# Patient Record
Sex: MALE | Race: WHITE | NOT HISPANIC OR LATINO | Employment: FULL TIME | URBAN - METROPOLITAN AREA
[De-identification: names, ages, dates, MRNs, and addresses within clinical notes are randomized per-mention and may not be internally consistent; named-entity substitution may affect disease eponyms.]

---

## 2023-05-25 ENCOUNTER — HOSPITAL ENCOUNTER (OUTPATIENT)
Facility: HOSPITAL | Age: 43
Setting detail: OBSERVATION
Discharge: HOME | End: 2023-05-27
Attending: EMERGENCY MEDICINE | Admitting: STUDENT IN AN ORGANIZED HEALTH CARE EDUCATION/TRAINING PROGRAM
Payer: MEDICAID

## 2023-05-25 ENCOUNTER — APPOINTMENT (OUTPATIENT)
Dept: CT IMAGING | Facility: HOSPITAL | Age: 43
End: 2023-05-25
Payer: MEDICAID

## 2023-05-25 DIAGNOSIS — K92.1 MELENA: ICD-10-CM

## 2023-05-25 DIAGNOSIS — R10.13 EPIGASTRIC PAIN: Primary | ICD-10-CM

## 2023-05-25 DIAGNOSIS — K92.2 GASTROINTESTINAL HEMORRHAGE, UNSPECIFIED GASTROINTESTINAL HEMORRHAGE TYPE: ICD-10-CM

## 2023-05-25 LAB
ABO GROUP BLD: NORMAL
ADV 40+41 DNA STL QL NAA+NON-PROBE: NOT DETECTED
ALBUMIN SERPL-MCNC: 4.5 G/DL (ref 3.5–5.2)
ALBUMIN/GLOB SERPL: 1.4 G/DL
ALP SERPL-CCNC: 130 U/L (ref 39–117)
ALT SERPL W P-5'-P-CCNC: 15 U/L (ref 1–41)
ANION GAP SERPL CALCULATED.3IONS-SCNC: 13 MMOL/L (ref 5–15)
APTT PPP: 30.6 SECONDS (ref 61–76.5)
AST SERPL-CCNC: 21 U/L (ref 1–40)
ASTRO TYP 1-8 RNA STL QL NAA+NON-PROBE: NOT DETECTED
BASOPHILS # BLD AUTO: 0.1 10*3/MM3 (ref 0–0.2)
BASOPHILS NFR BLD AUTO: 0.8 % (ref 0–1.5)
BILIRUB SERPL-MCNC: 0.4 MG/DL (ref 0–1.2)
BILIRUB UR QL STRIP: NEGATIVE
BLD GP AB SCN SERPL QL: NEGATIVE
BUN SERPL-MCNC: 15 MG/DL (ref 6–20)
BUN/CREAT SERPL: 19 (ref 7–25)
C CAYETANENSIS DNA STL QL NAA+NON-PROBE: NOT DETECTED
C COLI+JEJ+UPSA DNA STL QL NAA+NON-PROBE: NOT DETECTED
CALCIUM SPEC-SCNC: 9.3 MG/DL (ref 8.6–10.5)
CHLORIDE SERPL-SCNC: 107 MMOL/L (ref 98–107)
CLARITY UR: CLEAR
CO2 SERPL-SCNC: 24 MMOL/L (ref 22–29)
COLOR UR: YELLOW
CREAT SERPL-MCNC: 0.79 MG/DL (ref 0.76–1.27)
CRYPTOSP DNA STL QL NAA+NON-PROBE: NOT DETECTED
DEPRECATED RDW RBC AUTO: 42 FL (ref 37–54)
E HISTOLYT DNA STL QL NAA+NON-PROBE: NOT DETECTED
EAEC PAA PLAS AGGR+AATA ST NAA+NON-PRB: NOT DETECTED
EC STX1+STX2 GENES STL QL NAA+NON-PROBE: NOT DETECTED
EGFRCR SERPLBLD CKD-EPI 2021: 112.3 ML/MIN/1.73
EOSINOPHIL # BLD AUTO: 0.4 10*3/MM3 (ref 0–0.4)
EOSINOPHIL NFR BLD AUTO: 5.5 % (ref 0.3–6.2)
EPEC EAE GENE STL QL NAA+NON-PROBE: NOT DETECTED
ERYTHROCYTE [DISTWIDTH] IN BLOOD BY AUTOMATED COUNT: 13.8 % (ref 12.3–15.4)
ETEC LTA+ST1A+ST1B TOX ST NAA+NON-PROBE: NOT DETECTED
G LAMBLIA DNA STL QL NAA+NON-PROBE: NOT DETECTED
GLOBULIN UR ELPH-MCNC: 3.2 GM/DL
GLUCOSE SERPL-MCNC: 97 MG/DL (ref 65–99)
GLUCOSE UR STRIP-MCNC: NEGATIVE MG/DL
HCT VFR BLD AUTO: 41.1 % (ref 37.5–51)
HEMOCCULT STL QL IA: POSITIVE
HGB BLD-MCNC: 13.4 G/DL (ref 13–17.7)
HGB UR QL STRIP.AUTO: NEGATIVE
HOLD SPECIMEN: NORMAL
HOLD SPECIMEN: NORMAL
INR PPP: 0.99 (ref 0.93–1.1)
KETONES UR QL STRIP: ABNORMAL
LEUKOCYTE ESTERASE UR QL STRIP.AUTO: NEGATIVE
LIPASE SERPL-CCNC: 37 U/L (ref 13–60)
LYMPHOCYTES # BLD AUTO: 1.2 10*3/MM3 (ref 0.7–3.1)
LYMPHOCYTES NFR BLD AUTO: 14.7 % (ref 19.6–45.3)
MCH RBC QN AUTO: 28.4 PG (ref 26.6–33)
MCHC RBC AUTO-ENTMCNC: 32.6 G/DL (ref 31.5–35.7)
MCV RBC AUTO: 87.2 FL (ref 79–97)
MONOCYTES # BLD AUTO: 0.5 10*3/MM3 (ref 0.1–0.9)
MONOCYTES NFR BLD AUTO: 6.5 % (ref 5–12)
NEUTROPHILS NFR BLD AUTO: 5.9 10*3/MM3 (ref 1.7–7)
NEUTROPHILS NFR BLD AUTO: 72.5 % (ref 42.7–76)
NITRITE UR QL STRIP: NEGATIVE
NOROVIRUS GI+II RNA STL QL NAA+NON-PROBE: NOT DETECTED
NRBC BLD AUTO-RTO: 0 /100 WBC (ref 0–0.2)
P SHIGELLOIDES DNA STL QL NAA+NON-PROBE: NOT DETECTED
PH UR STRIP.AUTO: <=5 [PH] (ref 5–8)
PLATELET # BLD AUTO: 297 10*3/MM3 (ref 140–450)
PMV BLD AUTO: 7.5 FL (ref 6–12)
POTASSIUM SERPL-SCNC: 4.3 MMOL/L (ref 3.5–5.2)
PROT SERPL-MCNC: 7.7 G/DL (ref 6–8.5)
PROT UR QL STRIP: NEGATIVE
PROTHROMBIN TIME: 10.6 SECONDS (ref 9.6–11.7)
RBC # BLD AUTO: 4.71 10*6/MM3 (ref 4.14–5.8)
RH BLD: POSITIVE
RVA RNA STL QL NAA+NON-PROBE: NOT DETECTED
S ENT+BONG DNA STL QL NAA+NON-PROBE: NOT DETECTED
SAPO I+II+IV+V RNA STL QL NAA+NON-PROBE: NOT DETECTED
SHIGELLA SP+EIEC IPAH ST NAA+NON-PROBE: NOT DETECTED
SODIUM SERPL-SCNC: 144 MMOL/L (ref 136–145)
SP GR UR STRIP: 1.02 (ref 1–1.03)
T&S EXPIRATION DATE: NORMAL
UROBILINOGEN UR QL STRIP: ABNORMAL
V CHOL+PARA+VUL DNA STL QL NAA+NON-PROBE: NOT DETECTED
V CHOLERAE DNA STL QL NAA+NON-PROBE: NOT DETECTED
WBC NRBC COR # BLD: 8.1 10*3/MM3 (ref 3.4–10.8)
WHOLE BLOOD HOLD COAG: NORMAL
Y ENTEROCOL DNA STL QL NAA+NON-PROBE: NOT DETECTED

## 2023-05-25 PROCEDURE — 74177 CT ABD & PELVIS W/CONTRAST: CPT

## 2023-05-25 PROCEDURE — 25010000002 ONDANSETRON PER 1 MG: Performed by: EMERGENCY MEDICINE

## 2023-05-25 PROCEDURE — G0378 HOSPITAL OBSERVATION PER HR: HCPCS

## 2023-05-25 PROCEDURE — 85610 PROTHROMBIN TIME: CPT | Performed by: PHYSICIAN ASSISTANT

## 2023-05-25 PROCEDURE — 99284 EMERGENCY DEPT VISIT MOD MDM: CPT

## 2023-05-25 PROCEDURE — 85025 COMPLETE CBC W/AUTO DIFF WBC: CPT | Performed by: PHYSICIAN ASSISTANT

## 2023-05-25 PROCEDURE — 96374 THER/PROPH/DIAG INJ IV PUSH: CPT

## 2023-05-25 PROCEDURE — 25510000001 IOPAMIDOL PER 1 ML: Performed by: EMERGENCY MEDICINE

## 2023-05-25 PROCEDURE — 86900 BLOOD TYPING SEROLOGIC ABO: CPT | Performed by: PHYSICIAN ASSISTANT

## 2023-05-25 PROCEDURE — 83690 ASSAY OF LIPASE: CPT | Performed by: PHYSICIAN ASSISTANT

## 2023-05-25 PROCEDURE — 81003 URINALYSIS AUTO W/O SCOPE: CPT | Performed by: EMERGENCY MEDICINE

## 2023-05-25 PROCEDURE — 36415 COLL VENOUS BLD VENIPUNCTURE: CPT | Performed by: PHYSICIAN ASSISTANT

## 2023-05-25 PROCEDURE — 96375 TX/PRO/DX INJ NEW DRUG ADDON: CPT

## 2023-05-25 PROCEDURE — 80053 COMPREHEN METABOLIC PANEL: CPT | Performed by: PHYSICIAN ASSISTANT

## 2023-05-25 PROCEDURE — 86900 BLOOD TYPING SEROLOGIC ABO: CPT

## 2023-05-25 PROCEDURE — 86901 BLOOD TYPING SEROLOGIC RH(D): CPT

## 2023-05-25 PROCEDURE — 86850 RBC ANTIBODY SCREEN: CPT | Performed by: PHYSICIAN ASSISTANT

## 2023-05-25 PROCEDURE — 85730 THROMBOPLASTIN TIME PARTIAL: CPT | Performed by: PHYSICIAN ASSISTANT

## 2023-05-25 PROCEDURE — 87507 IADNA-DNA/RNA PROBE TQ 12-25: CPT | Performed by: EMERGENCY MEDICINE

## 2023-05-25 PROCEDURE — 86901 BLOOD TYPING SEROLOGIC RH(D): CPT | Performed by: PHYSICIAN ASSISTANT

## 2023-05-25 PROCEDURE — 82274 ASSAY TEST FOR BLOOD FECAL: CPT | Performed by: EMERGENCY MEDICINE

## 2023-05-25 RX ORDER — ACETAMINOPHEN 160 MG/5ML
650 SOLUTION ORAL EVERY 4 HOURS PRN
Status: DISCONTINUED | OUTPATIENT
Start: 2023-05-25 | End: 2023-05-27 | Stop reason: HOSPADM

## 2023-05-25 RX ORDER — ALBUTEROL SULFATE 2.5 MG/3ML
2.5 SOLUTION RESPIRATORY (INHALATION)
Status: DISCONTINUED | OUTPATIENT
Start: 2023-05-25 | End: 2023-05-25

## 2023-05-25 RX ORDER — SODIUM CHLORIDE 0.9 % (FLUSH) 0.9 %
10 SYRINGE (ML) INJECTION EVERY 12 HOURS SCHEDULED
Status: DISCONTINUED | OUTPATIENT
Start: 2023-05-25 | End: 2023-05-27 | Stop reason: HOSPADM

## 2023-05-25 RX ORDER — MORPHINE SULFATE 2 MG/ML
2 INJECTION, SOLUTION INTRAMUSCULAR; INTRAVENOUS EVERY 4 HOURS PRN
Status: DISCONTINUED | OUTPATIENT
Start: 2023-05-25 | End: 2023-05-27 | Stop reason: HOSPADM

## 2023-05-25 RX ORDER — ACETAMINOPHEN 650 MG/1
650 SUPPOSITORY RECTAL EVERY 4 HOURS PRN
Status: DISCONTINUED | OUTPATIENT
Start: 2023-05-25 | End: 2023-05-27 | Stop reason: HOSPADM

## 2023-05-25 RX ORDER — PANTOPRAZOLE SODIUM 40 MG/10ML
80 INJECTION, POWDER, LYOPHILIZED, FOR SOLUTION INTRAVENOUS ONCE
Status: COMPLETED | OUTPATIENT
Start: 2023-05-25 | End: 2023-05-25

## 2023-05-25 RX ORDER — ONDANSETRON 2 MG/ML
4 INJECTION INTRAMUSCULAR; INTRAVENOUS EVERY 6 HOURS PRN
Status: DISCONTINUED | OUTPATIENT
Start: 2023-05-25 | End: 2023-05-27 | Stop reason: HOSPADM

## 2023-05-25 RX ORDER — ONDANSETRON 2 MG/ML
4 INJECTION INTRAMUSCULAR; INTRAVENOUS ONCE
Status: COMPLETED | OUTPATIENT
Start: 2023-05-25 | End: 2023-05-25

## 2023-05-25 RX ORDER — SODIUM CHLORIDE 0.9 % (FLUSH) 0.9 %
10 SYRINGE (ML) INJECTION AS NEEDED
Status: DISCONTINUED | OUTPATIENT
Start: 2023-05-25 | End: 2023-05-27 | Stop reason: HOSPADM

## 2023-05-25 RX ORDER — SODIUM CHLORIDE 9 MG/ML
40 INJECTION, SOLUTION INTRAVENOUS AS NEEDED
Status: DISCONTINUED | OUTPATIENT
Start: 2023-05-25 | End: 2023-05-27 | Stop reason: HOSPADM

## 2023-05-25 RX ORDER — PANTOPRAZOLE SODIUM 40 MG/10ML
40 INJECTION, POWDER, LYOPHILIZED, FOR SOLUTION INTRAVENOUS
Status: DISCONTINUED | OUTPATIENT
Start: 2023-05-26 | End: 2023-05-26

## 2023-05-25 RX ORDER — SODIUM CHLORIDE 9 MG/ML
100 INJECTION, SOLUTION INTRAVENOUS CONTINUOUS
Status: DISCONTINUED | OUTPATIENT
Start: 2023-05-25 | End: 2023-05-27 | Stop reason: HOSPADM

## 2023-05-25 RX ORDER — ACETAMINOPHEN 325 MG/1
650 TABLET ORAL EVERY 4 HOURS PRN
Status: DISCONTINUED | OUTPATIENT
Start: 2023-05-25 | End: 2023-05-27 | Stop reason: HOSPADM

## 2023-05-25 RX ORDER — ALUMINA, MAGNESIA, AND SIMETHICONE 2400; 2400; 240 MG/30ML; MG/30ML; MG/30ML
15 SUSPENSION ORAL EVERY 6 HOURS PRN
Status: DISCONTINUED | OUTPATIENT
Start: 2023-05-25 | End: 2023-05-27 | Stop reason: HOSPADM

## 2023-05-25 RX ORDER — ONDANSETRON 4 MG/1
4 TABLET, FILM COATED ORAL EVERY 6 HOURS PRN
Status: DISCONTINUED | OUTPATIENT
Start: 2023-05-25 | End: 2023-05-27 | Stop reason: HOSPADM

## 2023-05-25 RX ORDER — CALCIUM CARBONATE 500 MG/1
2 TABLET, CHEWABLE ORAL 2 TIMES DAILY PRN
Status: DISCONTINUED | OUTPATIENT
Start: 2023-05-25 | End: 2023-05-27 | Stop reason: HOSPADM

## 2023-05-25 RX ORDER — CHOLECALCIFEROL (VITAMIN D3) 125 MCG
5 CAPSULE ORAL NIGHTLY PRN
Status: DISCONTINUED | OUTPATIENT
Start: 2023-05-25 | End: 2023-05-27 | Stop reason: HOSPADM

## 2023-05-25 RX ADMIN — PANTOPRAZOLE SODIUM 80 MG: 40 INJECTION, POWDER, FOR SOLUTION INTRAVENOUS at 20:43

## 2023-05-25 RX ADMIN — SODIUM CHLORIDE 100 ML/HR: 9 INJECTION, SOLUTION INTRAVENOUS at 23:55

## 2023-05-25 RX ADMIN — SODIUM CHLORIDE, POTASSIUM CHLORIDE, SODIUM LACTATE AND CALCIUM CHLORIDE 1000 ML: 600; 310; 30; 20 INJECTION, SOLUTION INTRAVENOUS at 20:42

## 2023-05-25 RX ADMIN — IOPAMIDOL 100 ML: 755 INJECTION, SOLUTION INTRAVENOUS at 22:06

## 2023-05-25 RX ADMIN — ONDANSETRON 4 MG: 2 INJECTION INTRAMUSCULAR; INTRAVENOUS at 20:43

## 2023-05-25 RX ADMIN — Medication 10 ML: at 23:11

## 2023-05-25 NOTE — ED PROVIDER NOTES
Subjective      Provider in Triage Note  Patient is a 43-year-old  male with no significant past medical history presents the ER complaints of abdominal pain, nausea, dark stools for a few days.  No appetite not able to eat or drink.  Patient states he has been under a lot of stress lately, he just moved from [place].  He states that they just sold their [place] house but also just bought a house in the area.  Patient states he has been having epigastric abdominal pain that is been on and off.  Some nausea, no vomiting.  He denies any diarrhea but states that today his stool was black.  He denies taking excessive amounts of NSAIDs.  No excessive alcohol or drinking.  No chest pain shortness of breath.  No fever chills.  Abdominal surgical history significant for gastric sleeve.  Wife had recently had Campylobacter gastroenteritis.  He has been taking over-the-counter Gas-X Mylanta without relief      History of Present Illness  History of present illness provider in triage note reviewed and agreed.        Review of Systems   Constitutional: Negative for chills and fever.   Respiratory: Negative for chest tightness and shortness of breath.    Cardiovascular: Negative for chest pain and palpitations.   Gastrointestinal: Positive for abdominal pain, blood in stool and nausea. Negative for vomiting.   Genitourinary: Negative for difficulty urinating and dysuria.   Musculoskeletal: Negative for back pain and neck pain.   Skin: Negative for rash and wound.   Neurological: Negative for dizziness and light-headedness.   Psychiatric/Behavioral: Negative for agitation and confusion.       Past Medical History:   Diagnosis Date   • Asthma    • Hypertension      No significant health problem he has had a gastric sleeve  Allergies   Allergen Reactions   • Molds & Smuts Hives   • Penicillins Hives       Past Surgical History:   Procedure Laterality Date   • LAPAROSCOPIC GASTRIC BYPASS       Gastric sleeve  History  reviewed. No pertinent family history.    Social History     Socioeconomic History   • Marital status:    Tobacco Use   • Smoking status: Never   • Smokeless tobacco: Never   Vaping Use   • Vaping Use: Never used   Substance and Sexual Activity   • Alcohol use: Never   • Drug use: Never   • Sexual activity: Defer   No routine prescription medications  Social history no tobacco alcohol or drug      Objective   Physical Exam  Constitutional this is a 43-year-old male awake alert no acute distress triage vital signs reviewed.  HEENT extraocular muscles are intact pupils equal round reactive sclera clear neck supple no adenopathy no JVD no bruits lungs clear no retractions heart regular without murmur.  Abdomen soft some mild mid abdominal epigastric tenderness but no rebound no guarding good bowel sounds no peritoneal findings or pulsatile masses.  Extremities pulses equal  no edema cords or Homans' sign or evidence of DVT.  Skin warm dry without rashes or cellulitic changes neurologic awake alert and follows commands monitorings normal without focal weakness.  Unable to perform rectal exam as the patient was in a hallway bed and we have no rooms available.  Procedures           ED Course      Results for orders placed or performed during the hospital encounter of 05/25/23   Gastrointestinal Panel, PCR - Stool, Per Rectum    Specimen: Per Rectum; Stool   Result Value Ref Range    Campylobacter Not Detected Not Detected    Plesiomonas shigelloides Not Detected Not Detected    Salmonella Not Detected Not Detected    Vibrio Not Detected Not Detected    Vibrio cholerae Not Detected Not Detected    Yersinia enterocolitica Not Detected Not Detected    Enteroaggregative E. coli (EAEC) Not Detected Not Detected    Enteropathogenic E. coli (EPEC) Not Detected Not Detected    Enterotoxigenic E. coli (ETEC) lt/st Not Detected Not Detected    Shiga-like toxin-producing E. coli (STEC) stx1/stx2 Not Detected Not Detected     Shigella/Enteroinvasive E. coli (EIEC) Not Detected Not Detected    Cryptosporidium Not Detected Not Detected    Cyclospora cayetanensis Not Detected Not Detected    Entamoeba histolytica Not Detected Not Detected    Giardia lamblia Not Detected Not Detected    Adenovirus F40/41 Not Detected Not Detected    Astrovirus Not Detected Not Detected    Norovirus GI/GII Not Detected Not Detected    Rotavirus A Not Detected Not Detected    Sapovirus (I, II, IV or V) Not Detected Not Detected   Comprehensive Metabolic Panel    Specimen: Blood   Result Value Ref Range    Glucose 97 65 - 99 mg/dL    BUN 15 6 - 20 mg/dL    Creatinine 0.79 0.76 - 1.27 mg/dL    Sodium 144 136 - 145 mmol/L    Potassium 4.3 3.5 - 5.2 mmol/L    Chloride 107 98 - 107 mmol/L    CO2 24.0 22.0 - 29.0 mmol/L    Calcium 9.3 8.6 - 10.5 mg/dL    Total Protein 7.7 6.0 - 8.5 g/dL    Albumin 4.5 3.5 - 5.2 g/dL    ALT (SGPT) 15 1 - 41 U/L    AST (SGOT) 21 1 - 40 U/L    Alkaline Phosphatase 130 (H) 39 - 117 U/L    Total Bilirubin 0.4 0.0 - 1.2 mg/dL    Globulin 3.2 gm/dL    A/G Ratio 1.4 g/dL    BUN/Creatinine Ratio 19.0 7.0 - 25.0    Anion Gap 13.0 5.0 - 15.0 mmol/L    eGFR 112.3 >60.0 mL/min/1.73   Protime-INR    Specimen: Blood   Result Value Ref Range    Protime 10.6 9.6 - 11.7 Seconds    INR 0.99 0.93 - 1.10   aPTT    Specimen: Blood   Result Value Ref Range    PTT 30.6 (L) 61.0 - 76.5 seconds   Lipase    Specimen: Blood   Result Value Ref Range    Lipase 37 13 - 60 U/L   CBC Auto Differential    Specimen: Blood   Result Value Ref Range    WBC 8.10 3.40 - 10.80 10*3/mm3    RBC 4.71 4.14 - 5.80 10*6/mm3    Hemoglobin 13.4 13.0 - 17.7 g/dL    Hematocrit 41.1 37.5 - 51.0 %    MCV 87.2 79.0 - 97.0 fL    MCH 28.4 26.6 - 33.0 pg    MCHC 32.6 31.5 - 35.7 g/dL    RDW 13.8 12.3 - 15.4 %    RDW-SD 42.0 37.0 - 54.0 fl    MPV 7.5 6.0 - 12.0 fL    Platelets 297 140 - 450 10*3/mm3    Neutrophil % 72.5 42.7 - 76.0 %    Lymphocyte % 14.7 (L) 19.6 - 45.3 %    Monocyte % 6.5  5.0 - 12.0 %    Eosinophil % 5.5 0.3 - 6.2 %    Basophil % 0.8 0.0 - 1.5 %    Neutrophils, Absolute 5.90 1.70 - 7.00 10*3/mm3    Lymphocytes, Absolute 1.20 0.70 - 3.10 10*3/mm3    Monocytes, Absolute 0.50 0.10 - 0.90 10*3/mm3    Eosinophils, Absolute 0.40 0.00 - 0.40 10*3/mm3    Basophils, Absolute 0.10 0.00 - 0.20 10*3/mm3    nRBC 0.0 0.0 - 0.2 /100 WBC   Urinalysis With Culture If Indicated - Urine, Clean Catch    Specimen: Urine, Clean Catch   Result Value Ref Range    Color, UA Yellow Yellow, Straw    Appearance, UA Clear Clear    pH, UA <=5.0 5.0 - 8.0    Specific Gravity, UA 1.025 1.005 - 1.030    Glucose, UA Negative Negative    Ketones, UA 40 mg/dL (2+) (A) Negative    Bilirubin, UA Negative Negative    Blood, UA Negative Negative    Protein, UA Negative Negative    Leuk Esterase, UA Negative Negative    Nitrite, UA Negative Negative    Urobilinogen, UA 0.2 E.U./dL 0.2 - 1.0 E.U./dL   Occult Blood, Fecal By Immunoassay - Stool, Per Rectum    Specimen: Per Rectum; Stool   Result Value Ref Range    Occult Blood, Fecal by Immunoassay Positive (A) Negative   CBC Auto Differential    Specimen: Blood   Result Value Ref Range    WBC 7.60 3.40 - 10.80 10*3/mm3    RBC 4.42 4.14 - 5.80 10*6/mm3    Hemoglobin 12.7 (L) 13.0 - 17.7 g/dL    Hematocrit 38.4 37.5 - 51.0 %    MCV 86.8 79.0 - 97.0 fL    MCH 28.7 26.6 - 33.0 pg    MCHC 33.1 31.5 - 35.7 g/dL    RDW 13.6 12.3 - 15.4 %    RDW-SD 41.6 37.0 - 54.0 fl    MPV 6.9 6.0 - 12.0 fL    Platelets 286 140 - 450 10*3/mm3    Neutrophil % 64.7 42.7 - 76.0 %    Lymphocyte % 20.0 19.6 - 45.3 %    Monocyte % 7.7 5.0 - 12.0 %    Eosinophil % 6.2 0.3 - 6.2 %    Basophil % 1.4 0.0 - 1.5 %    Neutrophils, Absolute 4.90 1.70 - 7.00 10*3/mm3    Lymphocytes, Absolute 1.50 0.70 - 3.10 10*3/mm3    Monocytes, Absolute 0.60 0.10 - 0.90 10*3/mm3    Eosinophils, Absolute 0.50 (H) 0.00 - 0.40 10*3/mm3    Basophils, Absolute 0.10 0.00 - 0.20 10*3/mm3    nRBC 0.0 0.0 - 0.2 /100 WBC   Type &  Screen    Specimen: Blood   Result Value Ref Range    ABO Type O     RH type Positive     Antibody Screen Negative     T&S Expiration Date 5/28/2023 11:59:59 PM    Green Top (Gel)   Result Value Ref Range    Extra Tube Hold for add-ons.    Gold Top - SST   Result Value Ref Range    Extra Tube Hold for add-ons.    Light Blue Top   Result Value Ref Range    Extra Tube Hold for add-ons.      CT Abdomen Pelvis With Contrast    Result Date: 5/25/2023  1.Small left effusion. Left basilar opacities may represent atelectasis or infiltrates. 2.Diverticulosis without diverticulitis. 3.Urinary bladder wall thickening may represent cystitis or other etiologies. Please correlate with urinalysis. Follow-up recommended. *Small fat-containing umbilical hernia. Focal inflammation/fat stranding just posterior to the umbilical hernia may represent fat necrosis. Follow-up recommended to document resolution. *Extensive mesenteric lymphadenopathy. Follow-up recommended to document resolution. Please correlate with history of underlying malignancy or lymphoma. Electronically Signed: Juan Torrez  5/25/2023 10:17 PM EDT  Workstation ID: [id number]    Medications   sodium chloride 0.9 % flush 10 mL (has no administration in time range)   sodium chloride 0.9 % flush 10 mL (has no administration in time range)   sodium chloride 0.9 % flush 10 mL (10 mL Intravenous Given 5/25/23 2318)   sodium chloride 0.9 % flush 10 mL (has no administration in time range)   sodium chloride 0.9 % infusion 40 mL (has no administration in time range)   acetaminophen (TYLENOL) tablet 650 mg (has no administration in time range)     Or   acetaminophen (TYLENOL) 160 MG/5ML solution 650 mg (has no administration in time range)     Or   acetaminophen (TYLENOL) suppository 650 mg (has no administration in time range)   calcium carbonate (TUMS) chewable tablet 500 mg (200 mg elemental) (has no administration in time range)   aluminum-magnesium hydroxide-simethicone  (MAALOX MAX) 400-400-40 MG/5ML suspension 15 mL (has no administration in time range)   ondansetron (ZOFRAN) tablet 4 mg (has no administration in time range)     Or   ondansetron (ZOFRAN) injection 4 mg (has no administration in time range)   melatonin tablet 5 mg (has no administration in time range)   pantoprazole (PROTONIX) injection 40 mg (has no administration in time range)   sodium chloride 0.9 % infusion (100 mL/hr Intravenous New Bag 5/25/23 2355)   morphine injection 2 mg (has no administration in time range)   pantoprazole (PROTONIX) injection 80 mg (80 mg Intravenous Given 5/25/23 2043)   ondansetron (ZOFRAN) injection 4 mg (4 mg Intravenous Given 5/25/23 2043)   lactated ringers bolus 1,000 mL (1,000 mL Intravenous New Bag 5/25/23 2042)   iopamidol (ISOVUE-370) 76 % injection 100 mL (100 mL Intravenous Given 5/25/23 2206)                                            Medical Decision Making  Medical decision making IV established liter lactated Ringer's for Zofran IV and Protonix 80 mg IV.  Labs obtained independent reviewed by me.  Stool panel was negative but positive for occult blood comprehensive metabolic profile unremarkable CBC unremarkable hemoglobin 13.4 liver lipase unremarkable urine negative..  All labs independent reviewed by me CT abdomen pelvis obtained and reviewed by me I do not see evidence of a bowel obstruction I do not see evidence of diverticulitis pancreatitis or appendicitis.  No evidence of ischemic bowel radiology review small left pleural effusion left basilar opacities which represents atelectasis or infiltrates diverticulosis without diverticulitis bladder wall thickening.  Patient has fat-containing umbilical hernia with some mild inflammation around it.  The patient repeat exam is feeling much better abdomen soft without tenderness good bowel sounds no peritoneal findings or pulsatile masses.  He did test positive for occult blood on his stain in the lab.  Has had black  stool has been on stress recently has a gastric surgery certainly risk for ulcers.  He has no appetite not able to eat or drink.  I do not see evidence of an aneurysm or dissection or ischemic bowel no evidence of any type of cardiac etiology.  This is not a complete list of all possibilities.  He states he has been having little bit of congestion cough with some mucus he has been taking shallow breaths I suspect this is more atelectasis versus an infectious etiology.  I talked to the hospitalist nurse practitioner we discussed the case and he will be admitted for further care and GI consultation    Epigastric pain: acute illness or injury  Amount and/or Complexity of Data Reviewed  Labs: ordered. Decision-making details documented in ED Course.  Radiology: ordered and independent interpretation performed. Decision-making details documented in ED Course.      Risk  Decision regarding hospitalization.          Final diagnoses:   Epigastric pain   Gastrointestinal hemorrhage, unspecified gastrointestinal hemorrhage type       ED Disposition  ED Disposition     ED Disposition   Decision to Admit    Condition   --    Comment   Level of Care: Telemetry [5]   Diagnosis: Melena [[id number]]   Admitting Physician: MARIA G COLES [[id number]]   Attending Physician: MARIA G COLES [[id number]]               No follow-up provider specified.       Medication List      No changes were made to your prescriptions during this visit.          Yannick Ayala MD  05/26/23 0206

## 2023-05-25 NOTE — Clinical Note
Level of Care: Telemetry [5]   Admitting Physician: MARIA G COLES [[id number]]   Attending Physician: MARIA G COLES [[id number]]

## 2023-05-26 ENCOUNTER — ANESTHESIA (OUTPATIENT)
Dept: GASTROENTEROLOGY | Facility: HOSPITAL | Age: 43
End: 2023-05-26
Payer: MEDICAID

## 2023-05-26 ENCOUNTER — ANESTHESIA EVENT (OUTPATIENT)
Dept: GASTROENTEROLOGY | Facility: HOSPITAL | Age: 43
End: 2023-05-26
Payer: MEDICAID

## 2023-05-26 ENCOUNTER — ON CAMPUS - OUTPATIENT (OUTPATIENT)
Dept: URBAN - METROPOLITAN AREA HOSPITAL 85 | Facility: HOSPITAL | Age: 43
End: 2023-05-26

## 2023-05-26 DIAGNOSIS — R11.0 NAUSEA: ICD-10-CM

## 2023-05-26 DIAGNOSIS — K28.9 GASTROJEJUNAL ULCER, UNSPECIFIED AS ACUTE OR CHRONIC, WITHOU: ICD-10-CM

## 2023-05-26 DIAGNOSIS — K21.00 GASTRO-ESOPHAGEAL REFLUX DISEASE WITH ESOPHAGITIS, WITHOUT B: ICD-10-CM

## 2023-05-26 DIAGNOSIS — K92.1 MELENA: ICD-10-CM

## 2023-05-26 DIAGNOSIS — R10.84 GENERALIZED ABDOMINAL PAIN: ICD-10-CM

## 2023-05-26 DIAGNOSIS — Z98.84 BARIATRIC SURGERY STATUS: ICD-10-CM

## 2023-05-26 DIAGNOSIS — R10.13 EPIGASTRIC PAIN: ICD-10-CM

## 2023-05-26 LAB
ANION GAP SERPL CALCULATED.3IONS-SCNC: 10 MMOL/L (ref 5–15)
ANION GAP SERPL CALCULATED.3IONS-SCNC: 10 MMOL/L (ref 5–15)
BASOPHILS # BLD AUTO: 0.1 10*3/MM3 (ref 0–0.2)
BASOPHILS # BLD AUTO: 0.1 10*3/MM3 (ref 0–0.2)
BASOPHILS NFR BLD AUTO: 1.4 % (ref 0–1.5)
BASOPHILS NFR BLD AUTO: 1.9 % (ref 0–1.5)
BUN SERPL-MCNC: 12 MG/DL (ref 6–20)
BUN SERPL-MCNC: 13 MG/DL (ref 6–20)
BUN/CREAT SERPL: 14 (ref 7–25)
BUN/CREAT SERPL: 16.2 (ref 7–25)
CALCIUM SPEC-SCNC: 8.5 MG/DL (ref 8.6–10.5)
CALCIUM SPEC-SCNC: 9.5 MG/DL (ref 8.6–10.5)
CHLORIDE SERPL-SCNC: 103 MMOL/L (ref 98–107)
CHLORIDE SERPL-SCNC: 110 MMOL/L (ref 98–107)
CO2 SERPL-SCNC: 23 MMOL/L (ref 22–29)
CO2 SERPL-SCNC: 27 MMOL/L (ref 22–29)
CREAT SERPL-MCNC: 0.74 MG/DL (ref 0.76–1.27)
CREAT SERPL-MCNC: 0.93 MG/DL (ref 0.76–1.27)
DEPRECATED RDW RBC AUTO: 41.6 FL (ref 37–54)
DEPRECATED RDW RBC AUTO: 42 FL (ref 37–54)
EGFRCR SERPLBLD CKD-EPI 2021: 103.8 ML/MIN/1.73
EGFRCR SERPLBLD CKD-EPI 2021: 114.6 ML/MIN/1.73
EOSINOPHIL # BLD AUTO: 0.4 10*3/MM3 (ref 0–0.4)
EOSINOPHIL # BLD AUTO: 0.5 10*3/MM3 (ref 0–0.4)
EOSINOPHIL NFR BLD AUTO: 6.2 % (ref 0.3–6.2)
EOSINOPHIL NFR BLD AUTO: 6.7 % (ref 0.3–6.2)
ERYTHROCYTE [DISTWIDTH] IN BLOOD BY AUTOMATED COUNT: 13.6 % (ref 12.3–15.4)
ERYTHROCYTE [DISTWIDTH] IN BLOOD BY AUTOMATED COUNT: 13.8 % (ref 12.3–15.4)
GLUCOSE SERPL-MCNC: 106 MG/DL (ref 65–99)
GLUCOSE SERPL-MCNC: 108 MG/DL (ref 65–99)
HBA1C MFR BLD: 5.4 % (ref 4.8–5.6)
HCT VFR BLD AUTO: 37.1 % (ref 37.5–51)
HCT VFR BLD AUTO: 38.4 % (ref 37.5–51)
HGB BLD-MCNC: 12.2 G/DL (ref 13–17.7)
HGB BLD-MCNC: 12.7 G/DL (ref 13–17.7)
LYMPHOCYTES # BLD AUTO: 1.2 10*3/MM3 (ref 0.7–3.1)
LYMPHOCYTES # BLD AUTO: 1.5 10*3/MM3 (ref 0.7–3.1)
LYMPHOCYTES NFR BLD AUTO: 19.7 % (ref 19.6–45.3)
LYMPHOCYTES NFR BLD AUTO: 20 % (ref 19.6–45.3)
MCH RBC QN AUTO: 28.3 PG (ref 26.6–33)
MCH RBC QN AUTO: 28.7 PG (ref 26.6–33)
MCHC RBC AUTO-ENTMCNC: 32.8 G/DL (ref 31.5–35.7)
MCHC RBC AUTO-ENTMCNC: 33.1 G/DL (ref 31.5–35.7)
MCV RBC AUTO: 86.3 FL (ref 79–97)
MCV RBC AUTO: 86.8 FL (ref 79–97)
MONOCYTES # BLD AUTO: 0.5 10*3/MM3 (ref 0.1–0.9)
MONOCYTES # BLD AUTO: 0.6 10*3/MM3 (ref 0.1–0.9)
MONOCYTES NFR BLD AUTO: 7.7 % (ref 5–12)
MONOCYTES NFR BLD AUTO: 8.3 % (ref 5–12)
NEUTROPHILS NFR BLD AUTO: 3.9 10*3/MM3 (ref 1.7–7)
NEUTROPHILS NFR BLD AUTO: 4.9 10*3/MM3 (ref 1.7–7)
NEUTROPHILS NFR BLD AUTO: 63.4 % (ref 42.7–76)
NEUTROPHILS NFR BLD AUTO: 64.7 % (ref 42.7–76)
NRBC BLD AUTO-RTO: 0 /100 WBC (ref 0–0.2)
NRBC BLD AUTO-RTO: 0 /100 WBC (ref 0–0.2)
PLATELET # BLD AUTO: 271 10*3/MM3 (ref 140–450)
PLATELET # BLD AUTO: 286 10*3/MM3 (ref 140–450)
PMV BLD AUTO: 6.9 FL (ref 6–12)
PMV BLD AUTO: 7.5 FL (ref 6–12)
POTASSIUM SERPL-SCNC: 3.8 MMOL/L (ref 3.5–5.2)
POTASSIUM SERPL-SCNC: 4 MMOL/L (ref 3.5–5.2)
RBC # BLD AUTO: 4.31 10*6/MM3 (ref 4.14–5.8)
RBC # BLD AUTO: 4.42 10*6/MM3 (ref 4.14–5.8)
SODIUM SERPL-SCNC: 140 MMOL/L (ref 136–145)
SODIUM SERPL-SCNC: 143 MMOL/L (ref 136–145)
WBC NRBC COR # BLD: 6.2 10*3/MM3 (ref 3.4–10.8)
WBC NRBC COR # BLD: 7.6 10*3/MM3 (ref 3.4–10.8)

## 2023-05-26 PROCEDURE — 80048 BASIC METABOLIC PNL TOTAL CA: CPT | Performed by: NURSE PRACTITIONER

## 2023-05-26 PROCEDURE — 85025 COMPLETE CBC W/AUTO DIFF WBC: CPT | Performed by: NURSE PRACTITIONER

## 2023-05-26 PROCEDURE — G0378 HOSPITAL OBSERVATION PER HR: HCPCS

## 2023-05-26 PROCEDURE — 36415 COLL VENOUS BLD VENIPUNCTURE: CPT | Performed by: NURSE PRACTITIONER

## 2023-05-26 PROCEDURE — 83036 HEMOGLOBIN GLYCOSYLATED A1C: CPT | Performed by: NURSE PRACTITIONER

## 2023-05-26 PROCEDURE — 96376 TX/PRO/DX INJ SAME DRUG ADON: CPT

## 2023-05-26 PROCEDURE — 25010000002 PROPOFOL 200 MG/20ML EMULSION: Performed by: NURSE ANESTHETIST, CERTIFIED REGISTERED

## 2023-05-26 PROCEDURE — 43235 EGD DIAGNOSTIC BRUSH WASH: CPT | Performed by: INTERNAL MEDICINE

## 2023-05-26 RX ORDER — PANTOPRAZOLE SODIUM 40 MG/1
40 TABLET, DELAYED RELEASE ORAL
Status: DISCONTINUED | OUTPATIENT
Start: 2023-05-26 | End: 2023-05-27 | Stop reason: HOSPADM

## 2023-05-26 RX ORDER — SODIUM CHLORIDE 9 MG/ML
INJECTION, SOLUTION INTRAVENOUS CONTINUOUS PRN
Status: DISCONTINUED | OUTPATIENT
Start: 2023-05-26 | End: 2023-05-26 | Stop reason: SURG

## 2023-05-26 RX ORDER — SODIUM CHLORIDE 0.9 % (FLUSH) 0.9 %
3 SYRINGE (ML) INJECTION EVERY 12 HOURS SCHEDULED
Status: DISCONTINUED | OUTPATIENT
Start: 2023-05-26 | End: 2023-05-27 | Stop reason: HOSPADM

## 2023-05-26 RX ORDER — LIDOCAINE HYDROCHLORIDE 20 MG/ML
INJECTION, SOLUTION INFILTRATION; PERINEURAL AS NEEDED
Status: DISCONTINUED | OUTPATIENT
Start: 2023-05-26 | End: 2023-05-26 | Stop reason: SURG

## 2023-05-26 RX ORDER — MEPERIDINE HYDROCHLORIDE 25 MG/ML
12.5 INJECTION INTRAMUSCULAR; INTRAVENOUS; SUBCUTANEOUS
Status: DISCONTINUED | OUTPATIENT
Start: 2023-05-26 | End: 2023-05-26 | Stop reason: HOSPADM

## 2023-05-26 RX ORDER — LISINOPRIL 20 MG/1
20 TABLET ORAL DAILY
Status: DISCONTINUED | OUTPATIENT
Start: 2023-05-26 | End: 2023-05-27 | Stop reason: HOSPADM

## 2023-05-26 RX ORDER — ONDANSETRON 2 MG/ML
4 INJECTION INTRAMUSCULAR; INTRAVENOUS ONCE AS NEEDED
Status: DISCONTINUED | OUTPATIENT
Start: 2023-05-26 | End: 2023-05-26 | Stop reason: HOSPADM

## 2023-05-26 RX ORDER — SUCRALFATE 1 G/1
1 TABLET ORAL
Status: DISCONTINUED | OUTPATIENT
Start: 2023-05-26 | End: 2023-05-27 | Stop reason: HOSPADM

## 2023-05-26 RX ORDER — HYDRALAZINE HYDROCHLORIDE 20 MG/ML
5 INJECTION INTRAMUSCULAR; INTRAVENOUS
Status: DISCONTINUED | OUTPATIENT
Start: 2023-05-26 | End: 2023-05-26 | Stop reason: HOSPADM

## 2023-05-26 RX ORDER — EPHEDRINE SULFATE 5 MG/ML
5 INJECTION INTRAVENOUS ONCE AS NEEDED
Status: DISCONTINUED | OUTPATIENT
Start: 2023-05-26 | End: 2023-05-26 | Stop reason: HOSPADM

## 2023-05-26 RX ORDER — IPRATROPIUM BROMIDE AND ALBUTEROL SULFATE 2.5; .5 MG/3ML; MG/3ML
3 SOLUTION RESPIRATORY (INHALATION) ONCE AS NEEDED
Status: DISCONTINUED | OUTPATIENT
Start: 2023-05-26 | End: 2023-05-26 | Stop reason: HOSPADM

## 2023-05-26 RX ORDER — ALBUTEROL SULFATE 90 UG/1
2 AEROSOL, METERED RESPIRATORY (INHALATION) EVERY 4 HOURS PRN
COMMUNITY

## 2023-05-26 RX ORDER — PANTOPRAZOLE SODIUM 40 MG/10ML
40 INJECTION, POWDER, LYOPHILIZED, FOR SOLUTION INTRAVENOUS
Status: DISCONTINUED | OUTPATIENT
Start: 2023-05-26 | End: 2023-05-26

## 2023-05-26 RX ORDER — AMLODIPINE BESYLATE 5 MG/1
10 TABLET ORAL DAILY
Status: DISCONTINUED | OUTPATIENT
Start: 2023-05-26 | End: 2023-05-27 | Stop reason: HOSPADM

## 2023-05-26 RX ORDER — PROPOFOL 10 MG/ML
INJECTION, EMULSION INTRAVENOUS AS NEEDED
Status: DISCONTINUED | OUTPATIENT
Start: 2023-05-26 | End: 2023-05-26 | Stop reason: SURG

## 2023-05-26 RX ORDER — SODIUM CHLORIDE 0.9 % (FLUSH) 0.9 %
3-10 SYRINGE (ML) INJECTION AS NEEDED
Status: DISCONTINUED | OUTPATIENT
Start: 2023-05-26 | End: 2023-05-27 | Stop reason: HOSPADM

## 2023-05-26 RX ORDER — LABETALOL HYDROCHLORIDE 5 MG/ML
5 INJECTION, SOLUTION INTRAVENOUS
Status: DISCONTINUED | OUTPATIENT
Start: 2023-05-26 | End: 2023-05-26 | Stop reason: HOSPADM

## 2023-05-26 RX ORDER — AMLODIPINE BESYLATE 10 MG/1
10 TABLET ORAL DAILY
COMMUNITY

## 2023-05-26 RX ORDER — LISINOPRIL 20 MG/1
20 TABLET ORAL DAILY
COMMUNITY

## 2023-05-26 RX ORDER — ALBUTEROL SULFATE 2.5 MG/3ML
2.5 SOLUTION RESPIRATORY (INHALATION) EVERY 6 HOURS PRN
Status: DISCONTINUED | OUTPATIENT
Start: 2023-05-26 | End: 2023-05-27 | Stop reason: HOSPADM

## 2023-05-26 RX ORDER — SODIUM CHLORIDE 9 MG/ML
40 INJECTION, SOLUTION INTRAVENOUS AS NEEDED
Status: DISCONTINUED | OUTPATIENT
Start: 2023-05-26 | End: 2023-05-27 | Stop reason: HOSPADM

## 2023-05-26 RX ADMIN — LIDOCAINE HYDROCHLORIDE 40 MG: 20 INJECTION, SOLUTION INFILTRATION; PERINEURAL at 14:40

## 2023-05-26 RX ADMIN — LISINOPRIL 20 MG: 20 TABLET ORAL at 09:24

## 2023-05-26 RX ADMIN — SUCRALFATE 1 G: 1 TABLET ORAL at 17:36

## 2023-05-26 RX ADMIN — PROPOFOL 50 MG: 10 INJECTION, EMULSION INTRAVENOUS at 14:41

## 2023-05-26 RX ADMIN — SUCRALFATE 1 G: 1 TABLET ORAL at 20:55

## 2023-05-26 RX ADMIN — Medication 10 ML: at 20:55

## 2023-05-26 RX ADMIN — Medication 3 ML: at 20:55

## 2023-05-26 RX ADMIN — SODIUM CHLORIDE: 0.9 INJECTION, SOLUTION INTRAVENOUS at 14:34

## 2023-05-26 RX ADMIN — PROPOFOL 50 MG: 10 INJECTION, EMULSION INTRAVENOUS at 14:42

## 2023-05-26 RX ADMIN — AMLODIPINE BESYLATE 10 MG: 5 TABLET ORAL at 09:24

## 2023-05-26 RX ADMIN — PANTOPRAZOLE SODIUM 40 MG: 40 INJECTION, POWDER, FOR SOLUTION INTRAVENOUS at 05:29

## 2023-05-26 RX ADMIN — PROPOFOL 100 MG: 10 INJECTION, EMULSION INTRAVENOUS at 14:40

## 2023-05-26 RX ADMIN — PANTOPRAZOLE SODIUM 40 MG: 40 TABLET, DELAYED RELEASE ORAL at 17:36

## 2023-05-26 RX ADMIN — Medication 10 ML: at 09:46

## 2023-05-26 NOTE — ANESTHESIA POSTPROCEDURE EVALUATION
Patient: Bruno Garcia    Procedure Summary     Date: 05/26/23 Room / Location: [facility] ENDOSCOPY 1 / [facility] ENDOSCOPY    Anesthesia Start: 1434 Anesthesia Stop: 1453    Procedure: ESOPHAGOGASTRODUODENOSCOPY Diagnosis:       Epigastric pain      Melena      (Epigastric pain [R10.13])      (Melena [K92.1])    Surgeons: Boni Campbell MD Provider: Tod Fair MD    Anesthesia Type: general ASA Status: 2          Anesthesia Type: general    Vitals  Vitals Value Taken Time   /72 05/26/23 1525   Temp     Pulse 68 05/26/23 1526   Resp     SpO2 96 % 05/26/23 1526   Vitals shown include unvalidated device data.        Post Anesthesia Care and Evaluation    Patient location during evaluation: PACU  Patient participation: complete - patient participated  Level of consciousness: awake  Pain scale: See nurse's notes for pain score.  Pain management: adequate    Airway patency: patent  Anesthetic complications: No anesthetic complications  PONV Status: none  Cardiovascular status: acceptable  Respiratory status: acceptable and spontaneous ventilation  Hydration status: acceptable    Comments: Patient seen and examined postoperatively; vital signs stable; SpO2 greater than or equal to 90%; cardiopulmonary status stable; nausea/vomiting adequately controlled; pain adequately controlled; no apparent anesthesia complications; patient discharged from anesthesia care when discharge criteria were met

## 2023-05-26 NOTE — H&P
"    [facility] Services  History & Physical    Patient Name: Bruno Garcia  : 1980  MRN: [id number]  Primary Care Physician:  Provider, No Known  Date of admission: 2023  Date and Time of Service: 2023 at 2319    Subjective      Chief Complaint: Abdominal pain, nausea, dark stools    History of Present Illness: Bruno Garcia is a 43 y.o. male who presented to [facility] on 2023 complaining of abdominal pain, nausea, dark stools.  Patient has been having generalized abdominal pain and nausea for a few weeks.  Abdominal pain is intermittent and described as \"gas pain\".  Patient rates the pain 1.5 on sliding scale 0-10.  He stated massaging his abdomen helps alleviate the pain and nothing aggravates the pain.  Patient stated he developed dark stools on 2023.  Patient denies vomiting, diarrhea, fever, chills, chest pain, shortness of air.    CT abd/pelvis showed left basilar opacities may represent atelectasis or infiltrates; diverticulosis; urinary bladder wall thickening; small fat-containing umbilical hernia; extensive mesenteric lymphadenopathy. Urinalysis showed ketones in urine. Fecal occult blood positive.  All labs unremarkable.      Review of Systems   Constitutional: Negative. Negative for chills and fever.   HENT: Negative.    Eyes: Negative.    Cardiovascular: Negative.    Respiratory: Negative.    Endocrine: Negative.    Skin: Negative.    Musculoskeletal: Negative.    Gastrointestinal: Positive for abdominal pain and nausea. Negative for diarrhea and vomiting.   Genitourinary: Negative.    Neurological: Negative.    Psychiatric/Behavioral: Negative.    Allergic/Immunologic: Negative.         Personal History     Past Medical History:   Diagnosis Date   • Asthma    • Hypertension        Past Surgical History:   Procedure Laterality Date   • LAPAROSCOPIC GASTRIC BYPASS         Family History: family history is not on file. Otherwise pertinent FHx was " reviewed and not pertinent to current issue.    Social History:  reports that he has never smoked. He has never used smokeless tobacco. He reports that he does not drink alcohol and does not use drugs.    Home Medications:  Prior to Admission Medications     None            Allergies:  Allergies   Allergen Reactions   • Molds & Smuts Hives   • Penicillins Hives       Objective      Vitals:   Temp:  [97.4 °F (36.3 °C)-98.5 °F (36.9 °C)] 97.4 °F (36.3 °C)  Heart Rate:  [66-67] 67  Resp:  [18-20] 18  BP: (159-168)/() 162/97    Physical Exam  Vitals and nursing note reviewed.   Constitutional:       Appearance: Normal appearance. He is obese.   HENT:      Head: Normocephalic.      Right Ear: External ear normal.      Left Ear: External ear normal.      Nose: Nose normal.      Mouth/Throat:      Pharynx: Oropharynx is clear.   Eyes:      Extraocular Movements: Extraocular movements intact.   Cardiovascular:      Rate and Rhythm: Normal rate and regular rhythm.      Pulses: Normal pulses.      Heart sounds: Normal heart sounds.   Pulmonary:      Effort: Pulmonary effort is normal.      Breath sounds: Normal breath sounds.   Abdominal:      General: Bowel sounds are normal.      Palpations: Abdomen is soft.   Musculoskeletal:         General: Normal range of motion.      Cervical back: Normal range of motion.   Skin:     General: Skin is warm and dry.   Neurological:      Mental Status: He is alert and oriented to person, place, and time.   Psychiatric:         Mood and Affect: Mood normal.         Behavior: Behavior normal.          Result Review    Result Review:  I have personally reviewed the results from the time of this admission to 5/26/2023 03:16 EDT and agree with these findings:  [x]  Laboratory  []  Microbiology  [x]  Radiology  []  EKG/Telemetry   []  Cardiology/Vascular   []  Pathology  []  Old records  []  Other:  Most notable findings include: as above      Assessment & Plan        Active Hospital  Problems:  Active Hospital Problems    Diagnosis    • **Melena      Plan:     Melena  -CT abdomen pelvis reviewed  -Fecal occult blood positive  -N.p.o.  -IV fluids ordered  -Protonix given in the ED, continue  -IV fluid bolus given in the ED  -Morphine and Zofran as needed  -GI consult    DVT prophylaxis:  Mechanical DVT prophylaxis orders are present.    CODE STATUS:    Code Status (Patient has no pulse and is not breathing): CPR (Attempt to Resuscitate)  Medical Interventions (Patient has pulse or is breathing): Full Support    Admission Status:  I believe this patient meets observation status.    I discussed the patient's findings and my recommendations with patient.    This patient has been examined wearing appropriate Personal Protective Equipment 05/26/23      Signature: Electronically signed by KAREEN Schmitz, 05/26/23, 03:16 EDT.  [facility] Hospitalist Team

## 2023-05-26 NOTE — PROGRESS NOTES
[facility]     Progress Note    Patient Name: Bruno Garcia  : 1980  MRN: [id number]  Primary Care Physician:  Provider, No Known  Date of admission: 2023    Subjective   Subjective     Chief Complaint: black stool    History of Present Illness  Patient seen and evaluated at bedside. Discussed with RN. Plan to go for EGD.    Review of Systems  Neg except for above  Objective   Objective     Vitals:   Temp:  [96.7 °F (35.9 °C)-97.4 °F (36.3 °C)] 97.3 °F (36.3 °C)  Heart Rate:  [50-71] 65  Resp:  [15-18] 15  BP: (118-162)/(59-97) 147/95    Physical Exam   Vitals and nursing note reviewed.   Constitutional:       Appearance: Normal appearance. He is obese.   HENT:      Head: Normocephalic.      Right Ear: External ear normal.      Left Ear: External ear normal.      Nose: Nose normal.      Mouth/Throat:      Pharynx: Oropharynx is clear.   Eyes:      Extraocular Movements: Extraocular movements intact.   Cardiovascular:      Rate and Rhythm: Normal rate and regular rhythm.      Pulses: Normal pulses.      Heart sounds: Normal heart sounds.   Pulmonary:      Effort: Pulmonary effort is normal.      Breath sounds: Normal breath sounds.   Abdominal:      General: Bowel sounds are normal.      Palpations: Abdomen is soft.   Musculoskeletal:         General: Normal range of motion.      Cervical back: Normal range of motion.   Skin:     General: Skin is warm and dry.   Neurological:      Mental Status: He is alert and oriented to person, place, and time.   Psychiatric:         Mood and Affect: Mood normal.         Behavior: Behavior normal  Result Review    Result Review:  I have personally reviewed the results from the time of this admission to 2023 19:25 EDT and agree with these findings:  [x]  Laboratory list / accordion  [x]  Microbiology  [x]  Radiology  []  EKG/Telemetry   []  Cardiology/Vascular   []  Pathology  []  Old records  [x]  Other:        Assessment & Plan   Assessment / Plan      Brief Patient Summary:  Bruno Garcia is a 43 y.o. male     Active Hospital Problems:  Active Hospital Problems    Diagnosis    • **Melena    • Epigastric pain      Plan:     Melena  -CT abdomen pelvis reviewed  -Fecal occult blood positive  -N.p.o.  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 twice daily for minimum of 8 weeks  Repeat EGD in 8 weeks   Carafate 1 g p.o. twice daily x4 weeks  Gastric bypass diet    DVT prophylaxis:  Mechanical DVT prophylaxis orders are present.    CODE STATUS:    Code Status (Patient has no pulse and is not breathing): CPR (Attempt to Resuscitate)  Medical Interventions (Patient has pulse or is breathing): Full Support    Disposition:  I expect patient to be discharged home tomorrow if remains stable. Will need oncology referral as outpatient for the extensive lymphadenopathy seen on CT.    Yusuf Rai MD

## 2023-05-26 NOTE — DISCHARGE INSTR - OTHER ORDERS
Until insurance gets established - [facility] in [place] for PCP - [phone].  After insurance gets established - [phone] for new PCP in your area.

## 2023-05-26 NOTE — PLAN OF CARE
Goal Outcome Evaluation:  Plan of Care Reviewed With: patient        Progress: no change  Outcome Evaluation: Pt is here with Melena, VSS, Pt  is resting with call light in reach. Will continue to monitor.

## 2023-05-26 NOTE — OP NOTE
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  They were placed in the left lateral decubitus position and IV conscious sedation was administered by anesthesia while being monitored continuously throughout the procedure.  The video Liposyn scope was introduced into the esophagus and was advanced quickly into the proximal gastric pouch.  Anatomy is consistent with a Velasquez-en-Y gastric bypass.  The scope was passed out of the gastrojejunal anastomosis and into the small bowel for approximately 60 cm, the full length of the upper endoscope.  On slow withdrawal close infection was performed and the jejunal mucosa was normal.  No ischemic, vascular, inflammatory, or bleeding lesions were identified.  At the gastrojejunal anastomosis there is a 1.5 cm moderately deep cratered anastomotic ulcer inferiorly.  No visible vessel or however stigmata of bleeding are seen.  The remaining gastric pouch mucosa was completely normal.  The squamocolumnar line at the GE junction with minimal grade A GERD.  No strictures seen remaining esophagus was normal the scope was removed he tolerated the procedure well returned to recovery in good condition.    Impression:  GERD A  Gastrojejunal anastomotic ulcer and gastric bypass patient    Recommendations:  Pantoprazole 40 mg p.o.  twice daily for minimum of 8 weeks  Repeat EGD in 8 weeks to confirm healing  Carafate 1 g p.o. twice daily x4 weeks  Gastric bypass diet  Since the patient's ulcer does not show however stigmata of rebleeding and his hemoglobin is 12, he can be discharged home on these medications and we will arrange outpatient endoscopy.  However, I do think he needs oncology referral as an inpatient or outpatient for the extensive lymphadenopathy seen on CT.      Boni Campbell MD     Date: 5/26/2023  Time: 14:54 EDT

## 2023-05-26 NOTE — PLAN OF CARE
Problem: Adult Inpatient Plan of Care  Goal: Plan of Care Review  Outcome: Ongoing, Progressing  Flowsheets (Taken 5/26/2023 0016)  Progress: no change  Plan of Care Reviewed With: patient  Outcome Evaluation: Pt is here with Melena, VSS, Pt  is resting with call light in reach. Will continue to monitor.  Goal: Patient-Specific Goal (Individualized)  Outcome: Ongoing, Progressing  Goal: Absence of Hospital-Acquired Illness or Injury  Outcome: Ongoing, Progressing  Goal: Optimal Comfort and Wellbeing  Outcome: Ongoing, Progressing  Goal: Readiness for Transition of Care  Outcome: Ongoing, Progressing  Intervention: Mutually Develop Transition Plan  Recent Flowsheet Documentation  Taken 5/26/2023 0027 by Barbara Saldana, RN  Transportation Anticipated: family or friend will provide  Patient/Family Anticipated Services at Transition: none  Patient/Family Anticipates Transition to: home with family  Taken 5/26/2023 0020 by Barbara Saldana, RN  Equipment Currently Used at Home: none   Goal Outcome Evaluation:  Plan of Care Reviewed With: patient        Progress: no change  Outcome Evaluation: Pt is here with Melena, VSS, Pt  is resting with call light in reach. Will continue to monitor.

## 2023-05-26 NOTE — ANESTHESIA PREPROCEDURE EVALUATION
Anesthesia Evaluation     Patient summary reviewed and Nursing notes reviewed   NPO Solid Status: > 8 hours  NPO Liquid Status: > 8 hours           Airway   Mallampati: III  TM distance: >3 FB  Neck ROM: full  Dental - normal exam     Pulmonary - normal exam   (+) asthma,  Cardiovascular - normal exam  Exercise tolerance: excellent (>7 METS)    ECG reviewed    (+) hypertension well controlled,       Neuro/Psych  GI/Hepatic/Renal/Endo    (+) obesity,       Musculoskeletal     Abdominal   (+) obese,    Substance History      OB/GYN          Other        ROS/Med Hx Other: Black stools      Phys Exam Other: Full Beard                Anesthesia Plan    ASA 2     general     intravenous induction     Anesthetic plan, risks, benefits, and alternatives have been provided, discussed and informed consent has been obtained with: patient.    Use of blood products discussed with patient .   Plan discussed with CRNA.        CODE STATUS:    Code Status (Patient has no pulse and is not breathing): CPR (Attempt to Resuscitate)  Medical Interventions (Patient has pulse or is breathing): Full Support

## 2023-05-26 NOTE — CASE MANAGEMENT/SOCIAL WORK
Social Work Assessment  [facility]     Patient Name: Bruno Garcia  MRN: [id number]  Today's Date: 5/26/2023    Admit Date: 5/25/2023     Demographic Summary     Row Name 05/26/23 1544       General Information    Reason for Consult insurance concerns    General Information Comments SW was consulted re: pt has out of state Medicaid and it was reported he recently moved to [place] from [place]. SW met with pt, his wife, and 2 minor children in room 4123. Pt reports taking a position at [employer] in [place], which is the reason behind the move. He and his family are living in an RV currently while waiting to move into their home that they just closed on. SW informed them of the need to switch their Medicaid and provided contact info for [employer]. Pt also inquired of services for his child, stating his eldest has Autism. SW provided contact information for [facility]. Pt thanked this writer and denies further needs at this time.              Halley Tang MSW, W  Medical Social Worker  Ph [phone]  Fax [phone]  [email or web address]

## 2023-05-26 NOTE — CASE MANAGEMENT/SOCIAL WORK
Discharge Planning Assessment   [facility]     Patient Name: Bruno Garcia  MRN: [id number]  Today's Date: 5/26/2023    Admit Date: 5/25/2023    Plan: Anticipate return home with family.   Discharge Needs Assessment     Row Name 05/26/23 1243       Living Environment    People in Home spouse;child(anna marie), dependent    Current Living Arrangements home    Potentially Unsafe Housing Conditions none    Primary Care Provided by self    Provides Primary Care For child(anna marie)    Family Caregiver if Needed spouse    Quality of Family Relationships supportive    Able to Return to Prior Arrangements yes       Resource/Environmental Concerns    Resource/Environmental Concerns none    Transportation Concerns none       Transition Planning    Patient/Family Anticipates Transition to home with family    Patient/Family Anticipated Services at Transition none    Transportation Anticipated family or friend will provide       Discharge Needs Assessment    Readmission Within the Last 30 Days no previous admission in last 30 days    Equipment Currently Used at Home none    Concerns to be Addressed discharge planning    Anticipated Changes Related to Illness none    Equipment Needed After Discharge none               Discharge Plan     Row Name 05/26/23 1244       Plan    Plan Anticipate return home with family.    Patient/Family in Agreement with Plan yes    Plan Comments Met with pt in room, he recently moved to [facility] [place] 2 weeks ago with his spouse and 3 kids. Pt is without PCP and insurance out of state. Information provided to print out on AVS for [facility] patient portal, until insurance established in [place]. Pt is IADLs and drives. Denies use of DME. Denies trouble affording home medications. Pt spouse will provide transportation. EGD today. IVF.              Continued Care and Services - Admitted Since 5/25/2023           Expected Discharge Date and Time     Expected Discharge Date Expected Discharge Time    May  27, 2023          Demographic Summary     Row Name 05/26/23 1243       General Information    Admission Type observation    Arrived From emergency department    Referral Source admission list    Reason for Consult discharge planning    Preferred Language English               Functional Status     Row Name 05/26/23 1243       Functional Status    Usual Activity Tolerance good    Current Activity Tolerance good       Functional Status, IADL    Medications independent    Meal Preparation independent    Housekeeping independent    Laundry independent    Shopping independent       Mental Status    General Appearance WDL WDL       Mental Status Summary    Recent Changes in Mental Status/Cognitive Functioning no changes                      Karina Echavarria RN

## 2023-05-26 NOTE — CONSULTS
"GI CONSULT  NOTE:    Referring Provider:  Lidia Schwarz NP    Chief complaint: Melena    Subjective .     History of present illness: Bruno Garcia is a 43 y.o. male with history of gastric bypass, hypertension, and asthma who presents with complaints of abdominal pain and melena.  The patient reports that he has been having generalized abdominal pain for the past 2 weeks.  Describes the pain as a \"gas pain.\"  States that walking around seems to help alleviate his pain and stress seems to worsen the pain.  He reports that melena began just yesterday.  He has had at least 2 episodes of dark stool.  He denies any oral iron or Pepto-Bismol intake.  States that typically moves his bowels regularly.  He has had some mild nausea, but denies any vomiting.  No heartburn or dysphagia.  Reports that he takes Aleve as needed, but denies daily NSAID use.  No recent fever.  He does report recent unintentional weight loss, but is unsure of amount.      Endo History:  Reports previous EGD 3 years ago prior to his gastric bypass surgery.  No previous colonoscopy.    Past Medical History:  Past Medical History:   Diagnosis Date   • Asthma    • Hypertension        Past Surgical History:  Past Surgical History:   Procedure Laterality Date   • LAPAROSCOPIC GASTRIC BYPASS         Social History:  Social History     Tobacco Use   • Smoking status: Never   • Smokeless tobacco: Never   Vaping Use   • Vaping Use: Never used   Substance Use Topics   • Alcohol use: Never   • Drug use: Never       Family History:  History reviewed. No pertinent family history.    Medications:  Medications Prior to Admission   Medication Sig Dispense Refill Last Dose   • albuterol sulfate  (90 Base) MCG/ACT inhaler Inhale 2 puffs Every 4 (Four) Hours As Needed for Wheezing.   5/25/2023   • amLODIPine (NORVASC) 10 MG tablet Take 1 tablet by mouth Daily.   5/25/2023   • lisinopril (PRINIVIL,ZESTRIL) 20 MG tablet Take 1 tablet by mouth Daily.   5/25/2023 "       Scheduled Meds:amLODIPine, 10 mg, Oral, Daily  lisinopril, 20 mg, Oral, Daily  pantoprazole, 40 mg, Intravenous, Q AM  sodium chloride, 10 mL, Intravenous, Q12H      Continuous Infusions:sodium chloride, 100 mL/hr, Last Rate: 100 mL/hr (05/25/23 2355)      PRN Meds:.•  acetaminophen **OR** acetaminophen **OR** acetaminophen  •  albuterol  •  aluminum-magnesium hydroxide-simethicone  •  calcium carbonate  •  melatonin  •  Morphine  •  ondansetron **OR** ondansetron  •  sodium chloride  •  [COMPLETED] Insert Peripheral IV **AND** sodium chloride  •  sodium chloride  •  sodium chloride  •  sodium chloride    ALLERGIES:  Molds & smuts and Penicillins    ROS:  The following systems were reviewed;   Constitution:  No fevers, no chills, unintentional weight loss  Skin: no rash, no jaundice  Eyes:  No blurry vision, no eye pain  HENT:  No change in hearing or smell  Resp:  No dyspnea or cough  CV:  No chest pain or palpitations  :  No dysuria, hematuria  Musculoskeletal:  No leg cramps or arthralgias  Neuro:  No tremor, no numbness  Psych:  No depression or confusion    Objective     Vital Signs:   Vitals:    05/25/23 2330 05/25/23 2351 05/26/23 0449 05/26/23 0824   BP: 159/90 162/97 122/74 137/84   BP Location:  Left arm Left arm Left arm   Patient Position:  Lying Lying Lying   Pulse:  67 50 71   Resp:  18 16 15   Temp:  97.4 °F (36.3 °C) 96.7 °F (35.9 °C) 97.2 °F (36.2 °C)   TempSrc:  Oral Axillary Axillary   SpO2:  96% 98% 95%   Weight:       Height:           Physical Exam:       General Appearance:    Awake and alert, in no acute distress   Head:    Normocephalic, without obvious abnormality, atraumatic   Throat:   No oral lesions, no thrush, oral mucosa moist   Lungs:     Respirations regular, even and unlabored   Chest Wall:    No abnormalities observed   Abdomen:     Soft, mild generalized tenderness, no rebound or guarding, non-distended   Rectal:     Deferred   Extremities:   Moves all extremities, no  edema, no cyanosis   Pulses:   Pulses palpable and equal bilaterally   Skin:   No rash, no jaundice, normal palpation   Lymph nodes:   No cervical, supraclavicular or submandibular palpable adenopathy   Neurologic:   Cranial nerves 2 - 12 grossly intact, no asterixis       Results Review:   I reviewed the patient's labs and imaging.  CBC    Results from last 7 days   Lab Units 05/26/23  0146 05/25/23  1843   WBC 10*3/mm3 7.60 8.10   HEMOGLOBIN g/dL 12.7* 13.4   PLATELETS 10*3/mm3 286 297     CMP   Results from last 7 days   Lab Units 05/26/23  0146 05/25/23  1843   SODIUM mmol/L 140 144   POTASSIUM mmol/L 3.8 4.3   CHLORIDE mmol/L 103 107   CO2 mmol/L 27.0 24.0   BUN mg/dL 12 15   CREATININE mg/dL 0.74* 0.79   GLUCOSE mg/dL 108* 97   ALBUMIN g/dL  --  4.5   BILIRUBIN mg/dL  --  0.4   ALK PHOS U/L  --  130*   AST (SGOT) U/L  --  21   ALT (SGPT) U/L  --  15   LIPASE U/L  --  37     Cr Clearance Estimated Creatinine Clearance: 177.7 mL/min (A) (by C-G formula based on SCr of 0.74 mg/dL (L)).  Coag   Results from last 7 days   Lab Units 05/25/23 1843   INR  0.99   APTT seconds 30.6*     HbA1C   Lab Results   Component Value Date    HGBA1C 5.40 05/26/2023     Blood Glucose No results found for: POCGLU  Infection     UA    Results from last 7 days   Lab Units 05/25/23 2001   NITRITE UA  Negative     Radiology(recent) CT Abdomen Pelvis With Contrast    Result Date: 5/25/2023  1.Small left effusion. Left basilar opacities may represent atelectasis or infiltrates. 2.Diverticulosis without diverticulitis. 3.Urinary bladder wall thickening may represent cystitis or other etiologies. Please correlate with urinalysis. Follow-up recommended. *Small fat-containing umbilical hernia. Focal inflammation/fat stranding just posterior to the umbilical hernia may represent fat necrosis. Follow-up recommended to document resolution. *Extensive mesenteric lymphadenopathy. Follow-up recommended to document resolution. Please correlate  with history of underlying malignancy or lymphoma. Electronically Signed: Juan Torrez  5/25/2023 10:17 PM EDT  Workstation ID: [id number]         ASSESSMENT:  -Melena -consider anastomotic ulcer, AVM, or other  -Generalized abdominal pain  -Nausea  -Unintentional weight loss  -Abnormal CT showing extensive mesenteric lymphadenopathy  -Mild normocytic anemia  -History of gastric bypass  -Hypertension  -Asthma    PLAN:  Patient is a 43-year-old male with history of gastric bypass, asthma, and hypertension who presents with complaints of melena and abdominal pain.    CT abdomen/pelvis W shows small left pleural effusion, diverticulosis without diverticulitis, possible cystitis, small fat-containing umbilical hernia, and extensive mesenteric lymphadenopathy.  We will proceed with EGD today for further evaluation of his melena and nausea.  Maintain NPO.  Increase PPI to twice daily dosing.  Antiemetics as needed.  Would recommend hematology/oncology consultation for further evaluation of his extensive mesenteric lymphadenopathy.  Supportive care.      I discussed the patients findings and my recommendations with the patient.  I will discuss case with Dr. Campbell and change the plan accordingly.    We appreciate the referral.    Electronically signed by KAREEN Grace, 05/26/23, 10:08 AM EDT.

## 2023-05-27 VITALS
HEIGHT: 72 IN | WEIGHT: 286.6 LBS | SYSTOLIC BLOOD PRESSURE: 136 MMHG | DIASTOLIC BLOOD PRESSURE: 89 MMHG | RESPIRATION RATE: 18 BRPM | BODY MASS INDEX: 38.82 KG/M2 | TEMPERATURE: 97.8 F | OXYGEN SATURATION: 96 % | HEART RATE: 60 BPM

## 2023-05-27 PROCEDURE — G0378 HOSPITAL OBSERVATION PER HR: HCPCS

## 2023-05-27 RX ORDER — SUCRALFATE 1 G/1
1 TABLET ORAL 2 TIMES DAILY
Qty: 60 TABLET | Refills: 0 | Status: SHIPPED | OUTPATIENT
Start: 2023-05-27

## 2023-05-27 RX ORDER — PANTOPRAZOLE SODIUM 40 MG/1
40 TABLET, DELAYED RELEASE ORAL
Qty: 118 TABLET | Refills: 0 | Status: SHIPPED | OUTPATIENT
Start: 2023-05-27 | End: 2023-07-25

## 2023-05-27 RX ADMIN — AMLODIPINE BESYLATE 10 MG: 5 TABLET ORAL at 08:47

## 2023-05-27 RX ADMIN — LISINOPRIL 20 MG: 20 TABLET ORAL at 08:47

## 2023-05-27 RX ADMIN — Medication 10 ML: at 08:46

## 2023-05-27 RX ADMIN — PANTOPRAZOLE SODIUM 40 MG: 40 TABLET, DELAYED RELEASE ORAL at 08:47

## 2023-05-27 RX ADMIN — SUCRALFATE 1 G: 1 TABLET ORAL at 08:47

## 2023-05-27 RX ADMIN — Medication 3 ML: at 08:46

## 2023-05-27 NOTE — PLAN OF CARE
Problem: Adult Inpatient Plan of Care  Goal: Plan of Care Review  Outcome: Ongoing, Progressing  Flowsheets (Taken 5/27/2023 0328)  Progress: improving  Plan of Care Reviewed With: patient  Goal: Patient-Specific Goal (Individualized)  Outcome: Ongoing, Progressing  Goal: Absence of Hospital-Acquired Illness or Injury  Outcome: Ongoing, Progressing  Intervention: Identify and Manage Fall Risk  Recent Flowsheet Documentation  Taken 5/27/2023 0200 by Hortencia Perry LPN  Safety Promotion/Fall Prevention: safety round/check completed  Taken 5/27/2023 0028 by Hortencia Perry LPN  Safety Promotion/Fall Prevention: safety round/check completed  Taken 5/26/2023 2200 by Hortencia Perry LPN  Safety Promotion/Fall Prevention: safety round/check completed  Taken 5/26/2023 2048 by Hortencia Perry LPN  Safety Promotion/Fall Prevention:   safety round/check completed   room organization consistent   assistive device/personal items within reach   clutter free environment maintained  Intervention: Prevent Skin Injury  Recent Flowsheet Documentation  Taken 5/27/2023 0200 by Hortencia Perry LPN  Body Position: position changed independently  Taken 5/27/2023 0028 by Hortencia Perry LPN  Body Position: position changed independently  Taken 5/26/2023 2200 by Hortencia Perry LPN  Body Position: position changed independently  Taken 5/26/2023 2048 by Hortencia Perry LPN  Body Position: position changed independently  Skin Protection:   adhesive use limited   tubing/devices free from skin contact  Intervention: Prevent and Manage VTE (Venous Thromboembolism) Risk  Recent Flowsheet Documentation  Taken 5/26/2023 2048 by Hortencia Perry LPN  Activity Management: activity encouraged  VTE Prevention/Management: (Up ad laura) other (see comments)  Intervention: Prevent Infection  Recent Flowsheet Documentation  Taken 5/27/2023 0200 by Hortencia Perry LPN  Infection Prevention: environmental surveillance performed  Taken  5/27/2023 0028 by Hortencia Perry LPN  Infection Prevention: environmental surveillance performed  Taken 5/26/2023 2200 by Hortencia Perry LPN  Infection Prevention: environmental surveillance performed  Taken 5/26/2023 2048 by Hortencia Perry LPN  Infection Prevention: environmental surveillance performed  Goal: Optimal Comfort and Wellbeing  Outcome: Ongoing, Progressing  Intervention: Provide Person-Centered Care  Recent Flowsheet Documentation  Taken 5/26/2023 2048 by Hortencia Perry LPN  Trust Relationship/Rapport: choices provided  Goal: Readiness for Transition of Care  Outcome: Ongoing, Progressing     Problem: Bleeding (Surgery Nonspecified)  Goal: Absence of Bleeding  Outcome: Ongoing, Progressing     Problem: Bowel Motility Impaired (Surgery Nonspecified)  Goal: Effective Bowel Elimination  Outcome: Ongoing, Progressing     Problem: Fluid and Electrolyte Imbalance (Surgery Nonspecified)  Goal: Fluid and Electrolyte Balance  Outcome: Ongoing, Progressing     Problem: Glycemic Control Impaired (Surgery Nonspecified)  Goal: Blood Glucose Level Within Targeted Range  Outcome: Ongoing, Progressing     Problem: Infection (Surgery Nonspecified)  Goal: Absence of Infection Signs and Symptoms  Outcome: Ongoing, Progressing     Problem: Ongoing Anesthesia Effects (Surgery Nonspecified)  Goal: Anesthesia/Sedation Recovery  Outcome: Ongoing, Progressing  Intervention: Optimize Anesthesia Recovery  Recent Flowsheet Documentation  Taken 5/27/2023 0200 by Hortencia Perry LPN  Safety Promotion/Fall Prevention: safety round/check completed  Taken 5/27/2023 0028 by Hortencia Perry LPN  Safety Promotion/Fall Prevention: safety round/check completed  Taken 5/26/2023 2200 by Hortencia Perry LPN  Safety Promotion/Fall Prevention: safety round/check completed  Taken 5/26/2023 2048 by Hortencia Perry LPN  Safety Promotion/Fall Prevention:   safety round/check completed   room organization consistent   assistive  device/personal items within reach   clutter free environment maintained     Problem: Pain (Surgery Nonspecified)  Goal: Acceptable Pain Control  Outcome: Ongoing, Progressing     Problem: Postoperative Nausea and Vomiting (Surgery Nonspecified)  Goal: Nausea and Vomiting Relief  Outcome: Ongoing, Progressing     Problem: Postoperative Urinary Retention (Surgery Nonspecified)  Goal: Effective Urinary Elimination  Outcome: Ongoing, Progressing     Problem: Respiratory Compromise (Surgery Nonspecified)  Goal: Effective Oxygenation and Ventilation  Outcome: Ongoing, Progressing  Intervention: Optimize Oxygenation and Ventilation  Recent Flowsheet Documentation  Taken 5/26/2023 2048 by Hortencia Perry LPN  Head of Bed (HOB) Positioning:   HOB at 15 degrees   HOB elevated   Goal Outcome Evaluation:  Plan of Care Reviewed With: patient        Progress: improving

## 2023-05-27 NOTE — DISCHARGE SUMMARY
[facility] Medicine Services  Discharge Summary    Date of Service: 2023  Patient Name: Bruno Garcia  : 1980  MRN: [id number]    Date of Admission: 2023  Discharge Diagnosis:     Gastrojejunal anastomotic ulcer/melena    Date of Discharge: 2023  Primary Care Physician: Provider, No Known      Presenting Problem:   Acute respiratory distress [R06.03]  Melena [K92.1]  Epigastric pain [R10.13]    Active and Resolved Hospital Problems:  Active Hospital Problems    Diagnosis POA   • **Melena [K92.1] Yes   • Epigastric pain [R10.13] Unknown      Resolved Hospital Problems   No resolved problems to display.         Hospital Course     Hospital Course:  Bruno Garcia is a 43 y.o. male     Melena  -CT abdomen pelvis reviewed  -Fecal occult blood positive  -N.p.o.  -IV fluids ordered  -Protonix given in the ED, continue  -IV fluid bolus given in the ED  -Morphine and Zofran as needed  -GI consult, EGD 2023 reveals Gastrojejunal anastomotic ulcer and gastric bypass patient  Pantoprazole 40 mg p.o. twice daily for minimum of 8 weeks  Repeat EGD in 8 weeks   Carafate 1 g p.o. twice daily x4 weeks  Gastric bypass diet     Patient is medically stable and eager to go home.  Will be discharged home    DISCHARGE Follow Up Recommendations for labs and diagnostics:   Per instructions      Reasons For Change In Medications and Indications for New Medications:      Day of Discharge     Vital Signs:  Temp:  [97.3 °F (36.3 °C)-98.8 °F (37.1 °C)] 97.8 °F (36.6 °C)  Heart Rate:  [56-70] 60  Resp:  [14-18] 18  BP: (118-161)/(59-95) 136/89    Physical Exam:  Physical Exam     Constitutional:       Appearance: Normal appearance. He is obese.   HENT:      Head: Normocephalic.      Right Ear: External ear normal.      Left Ear: External ear normal.      Nose: Nose normal.      Mouth/Throat:      Pharynx: Oropharynx is clear.   Eyes:      Extraocular Movements: Extraocular movements intact.    Cardiovascular:      Rate and Rhythm: Normal rate and regular rhythm.      Pulses: Normal pulses.      Heart sounds: Normal heart sounds.   Pulmonary:      Effort: Pulmonary effort is normal.      Breath sounds: Normal breath sounds.   Abdominal:      General: Bowel sounds are normal.      Palpations: Abdomen is soft.   Musculoskeletal:         General: Normal range of motion.      Cervical back: Normal range of motion.   Skin:     General: Skin is warm and dry.   Neurological:      Mental Status: He is alert and oriented to person, place, and time.   Psychiatric:         Mood and Affect: Mood normal.         Behavior: Behavior normal    Pertinent  and/or Most Recent Results     LAB RESULTS:      Lab 05/26/23 2257 05/26/23 0146 05/25/23 1843   WBC 6.20 7.60 8.10   HEMOGLOBIN 12.2* 12.7* 13.4   HEMATOCRIT 37.1* 38.4 41.1   PLATELETS 271 286 297   NEUTROS ABS 3.90 4.90 5.90   LYMPHS ABS 1.20 1.50 1.20   MONOS ABS 0.50 0.60 0.50   EOS ABS 0.40 0.50* 0.40   MCV 86.3 86.8 87.2   PROTIME  --   --  10.6   APTT  --   --  30.6*         Lab 05/26/23 2257 05/26/23 0146 05/25/23 1843   SODIUM 143 140 144   POTASSIUM 4.0 3.8 4.3   CHLORIDE 110* 103 107   CO2 23.0 27.0 24.0   ANION GAP 10.0 10.0 13.0   BUN 13 12 15   CREATININE 0.93 0.74* 0.79   EGFR 103.8 114.6 112.3   GLUCOSE 106* 108* 97   CALCIUM 8.5* 9.5 9.3   HEMOGLOBIN A1C  --  5.40  --          Lab 05/25/23 1843   TOTAL PROTEIN 7.7   ALBUMIN 4.5   GLOBULIN 3.2   ALT (SGPT) 15   AST (SGOT) 21   BILIRUBIN 0.4   ALK PHOS 130*   LIPASE 37         Lab 05/25/23 1843   PROTIME 10.6   INR 0.99             Lab 05/25/23 1910   ABO TYPING O   RH TYPING Positive   ANTIBODY SCREEN Negative         Brief Urine Lab Results  (Last result in the past 365 days)      Color   Clarity   Blood   Leuk Est   Nitrite   Protein   CREAT   Urine HCG        05/25/23 2001 Yellow   Clear   Negative   Negative   Negative   Negative               Microbiology Results (last 10 days)      Procedure Component Value - Date/Time    Gastrointestinal Panel, PCR - Stool, Per Rectum [[id number]]  (Normal) Collected: 05/25/23 2047    Lab Status: Final result Specimen: Stool from Per Rectum Updated: 05/25/23 2211     Campylobacter Not Detected     Plesiomonas shigelloides Not Detected     Salmonella Not Detected     Vibrio Not Detected     Vibrio cholerae Not Detected     Yersinia enterocolitica Not Detected     Enteroaggregative E. coli (EAEC) Not Detected     Enteropathogenic E. coli (EPEC) Not Detected     Enterotoxigenic E. coli (ETEC) lt/st Not Detected     Shiga-like toxin-producing E. coli (STEC) stx1/stx2 Not Detected     Shigella/Enteroinvasive E. coli (EIEC) Not Detected     Cryptosporidium Not Detected     Cyclospora cayetanensis Not Detected     Entamoeba histolytica Not Detected     Giardia lamblia Not Detected     Adenovirus F40/41 Not Detected     Astrovirus Not Detected     Norovirus GI/GII Not Detected     Rotavirus A Not Detected     Sapovirus (I, II, IV or V) Not Detected    Narrative:      If Aeromonas, Staphylococcus aureus or Bacillus cereus are suspected, please order [id number]: Stool Culture, Aeromonas, S aureus, B Cereus.          CT Abdomen Pelvis With Contrast    Result Date: 5/25/2023  Impression: 1.Small left effusion. Left basilar opacities may represent atelectasis or infiltrates. 2.Diverticulosis without diverticulitis. 3.Urinary bladder wall thickening may represent cystitis or other etiologies. Please correlate with urinalysis. Follow-up recommended. *Small fat-containing umbilical hernia. Focal inflammation/fat stranding just posterior to the umbilical hernia may represent fat necrosis. Follow-up recommended to document resolution. *Extensive mesenteric lymphadenopathy. Follow-up recommended to document resolution. Please correlate with history of underlying malignancy or lymphoma. Electronically Signed: Juan Torrez  5/25/2023 10:17 PM EDT  Workstation ID:  [id number]                  Labs Pending at Discharge:      Procedures Performed  Procedure(s):  ESOPHAGOGASTRODUODENOSCOPY  05/26 1437 UPPER GI ENDOSCOPY      Consults:   Consults     Date and Time Order Name Status Description    5/25/2023 11:08 PM Inpatient Gastroenterology Consult Completed             Discharge Details        Discharge Medications      New Medications      Instructions Start Date   pantoprazole 40 MG EC tablet  Commonly known as: PROTONIX   40 mg, Oral, 2 Times Daily Before Meals      sucralfate 1 g tablet  Commonly known as: CARAFATE   1 g, Oral, 2 Times Daily         Continue These Medications      Instructions Start Date   albuterol sulfate  (90 Base) MCG/ACT inhaler  Commonly known as: PROVENTIL HFA;VENTOLIN HFA;PROAIR HFA   2 puffs, Inhalation, Every 4 Hours PRN      amLODIPine 10 MG tablet  Commonly known as: NORVASC   10 mg, Oral, Daily      lisinopril 20 MG tablet  Commonly known as: PRINIVIL,ZESTRIL   20 mg, Oral, Daily             Allergies   Allergen Reactions   • Molds & Smuts Hives   • Penicillins Hives         Discharge Disposition:   Home or Self Care    Diet:  Hospital:  Diet Order   Procedures   • Diet: Regular/House Diet; Texture: Regular Texture (IDDSI 7); Fluid Consistency: Thin (IDDSI 0)         Discharge Activity:   As tolerated/per instructions      CODE STATUS:  Code Status and Medical Interventions:   Ordered at: 05/25/23 5702     Code Status (Patient has no pulse and is not breathing):    CPR (Attempt to Resuscitate)     Medical Interventions (Patient has pulse or is breathing):    Full Support         No future appointments.  Future appointments per instructions      Time spent on Discharge including face to face service:  40  minutes    This patient has been examined wearing appropriate Personal Protective Equipment and discussed with Gastroenterologist. 05/27/23      Signature: Electronically signed by Yusuf Rai MD, 05/27/23, 09:44 EDT.  Amish Chatterjee  Hospitalist Team

## 2023-05-30 NOTE — CASE MANAGEMENT/SOCIAL WORK
Case Management Discharge Note      Final Note: home         Selected Continued Care - Discharged on 5/27/2023 Admission date: 5/25/2023 - Discharge disposition: Home or Self Care            Transportation Services  Private: Car    Final Discharge Disposition Code: 01 - home or self-care

## 2024-12-21 ENCOUNTER — APPOINTMENT (OUTPATIENT)
Dept: GENERAL RADIOLOGY | Facility: HOSPITAL | Age: 44
End: 2024-12-21
Payer: MEDICAID

## 2024-12-21 ENCOUNTER — HOSPITAL ENCOUNTER (OUTPATIENT)
Facility: HOSPITAL | Age: 44
Setting detail: OBSERVATION
Discharge: HOME | End: 2024-12-22
Attending: EMERGENCY MEDICINE | Admitting: EMERGENCY MEDICINE
Payer: MEDICAID

## 2024-12-21 DIAGNOSIS — J18.9 MULTIFOCAL PNEUMONIA: Primary | ICD-10-CM

## 2024-12-21 DIAGNOSIS — J45.41 MODERATE PERSISTENT ASTHMA WITH EXACERBATION: ICD-10-CM

## 2024-12-21 DIAGNOSIS — J10.1 INFLUENZA DUE TO INFLUENZA VIRUS, TYPE A, HUMAN: ICD-10-CM

## 2024-12-21 DIAGNOSIS — R09.02 HYPOXEMIA: ICD-10-CM

## 2024-12-21 LAB
ALBUMIN SERPL-MCNC: 4.2 G/DL (ref 3.5–5.2)
ALBUMIN/GLOB SERPL: 1 G/DL
ALP SERPL-CCNC: 130 U/L (ref 39–117)
ALT SERPL W P-5'-P-CCNC: 22 U/L (ref 1–41)
ANION GAP SERPL CALCULATED.3IONS-SCNC: 15.3 MMOL/L (ref 5–15)
AST SERPL-CCNC: 32 U/L (ref 1–40)
B PARAPERT DNA SPEC QL NAA+PROBE: NOT DETECTED
B PERT DNA SPEC QL NAA+PROBE: NOT DETECTED
BILIRUB SERPL-MCNC: 0.6 MG/DL (ref 0–1.2)
BUN SERPL-MCNC: 15 MG/DL (ref 6–20)
BUN/CREAT SERPL: 12 (ref 7–25)
C PNEUM DNA NPH QL NAA+NON-PROBE: NOT DETECTED
CALCIUM SPEC-SCNC: 9.4 MG/DL (ref 8.6–10.5)
CHLORIDE SERPL-SCNC: 97 MMOL/L (ref 98–107)
CO2 SERPL-SCNC: 21.7 MMOL/L (ref 22–29)
CREAT SERPL-MCNC: 1.25 MG/DL (ref 0.76–1.27)
D-LACTATE SERPL-SCNC: 0.9 MMOL/L (ref 0.3–2)
DEPRECATED RDW RBC AUTO: 40.5 FL (ref 37–54)
EGFRCR SERPLBLD CKD-EPI 2021: 72.4 ML/MIN/1.73
ERYTHROCYTE [DISTWIDTH] IN BLOOD BY AUTOMATED COUNT: 13 % (ref 12.3–15.4)
FLUAV H3 RNA NPH QL NAA+PROBE: DETECTED
FLUBV RNA ISLT QL NAA+PROBE: NOT DETECTED
GLOBULIN UR ELPH-MCNC: 4.2 GM/DL
GLUCOSE SERPL-MCNC: 149 MG/DL (ref 65–99)
HADV DNA SPEC NAA+PROBE: NOT DETECTED
HCOV 229E RNA SPEC QL NAA+PROBE: NOT DETECTED
HCOV HKU1 RNA SPEC QL NAA+PROBE: NOT DETECTED
HCOV NL63 RNA SPEC QL NAA+PROBE: NOT DETECTED
HCOV OC43 RNA SPEC QL NAA+PROBE: NOT DETECTED
HCT VFR BLD AUTO: 42.7 % (ref 37.5–51)
HGB BLD-MCNC: 14.3 G/DL (ref 13–17.7)
HMPV RNA NPH QL NAA+NON-PROBE: NOT DETECTED
HOLD SPECIMEN: NORMAL
HOLD SPECIMEN: NORMAL
HPIV1 RNA ISLT QL NAA+PROBE: NOT DETECTED
HPIV2 RNA SPEC QL NAA+PROBE: NOT DETECTED
HPIV3 RNA NPH QL NAA+PROBE: NOT DETECTED
HPIV4 P GENE NPH QL NAA+PROBE: NOT DETECTED
LYMPHOCYTES # BLD MANUAL: 0.75 10*3/MM3 (ref 0.7–3.1)
LYMPHOCYTES NFR BLD MANUAL: 8 % (ref 5–12)
M PNEUMO IGG SER IA-ACNC: NOT DETECTED
MCH RBC QN AUTO: 28.7 PG (ref 26.6–33)
MCHC RBC AUTO-ENTMCNC: 33.5 G/DL (ref 31.5–35.7)
MCV RBC AUTO: 85.7 FL (ref 79–97)
MONOCYTES # BLD: 1.2 10*3/MM3 (ref 0.1–0.9)
NEUTROPHILS # BLD AUTO: 13.03 10*3/MM3 (ref 1.7–7)
NEUTROPHILS NFR BLD MANUAL: 85 % (ref 42.7–76)
NEUTS BAND NFR BLD MANUAL: 2 % (ref 0–5)
PLAT MORPH BLD: NORMAL
PLATELET # BLD AUTO: 238 10*3/MM3 (ref 140–450)
PMV BLD AUTO: 8.7 FL (ref 6–12)
POTASSIUM SERPL-SCNC: 3.7 MMOL/L (ref 3.5–5.2)
PROT SERPL-MCNC: 8.4 G/DL (ref 6–8.5)
RBC # BLD AUTO: 4.98 10*6/MM3 (ref 4.14–5.8)
RBC MORPH BLD: NORMAL
RHINOVIRUS RNA SPEC NAA+PROBE: NOT DETECTED
RSV RNA NPH QL NAA+NON-PROBE: NOT DETECTED
SARS-COV-2 RNA RESP QL NAA+PROBE: NOT DETECTED
SCAN SLIDE: NORMAL
SODIUM SERPL-SCNC: 134 MMOL/L (ref 136–145)
VARIANT LYMPHS NFR BLD MANUAL: 5 % (ref 19.6–45.3)
WBC MORPH BLD: NORMAL
WBC NRBC COR # BLD AUTO: 14.98 10*3/MM3 (ref 3.4–10.8)
WHOLE BLOOD HOLD COAG: NORMAL
WHOLE BLOOD HOLD SPECIMEN: NORMAL

## 2024-12-21 PROCEDURE — G0378 HOSPITAL OBSERVATION PER HR: HCPCS

## 2024-12-21 PROCEDURE — 25810000003 LACTATED RINGERS SOLUTION: Performed by: EMERGENCY MEDICINE

## 2024-12-21 PROCEDURE — 85025 COMPLETE CBC W/AUTO DIFF WBC: CPT | Performed by: EMERGENCY MEDICINE

## 2024-12-21 PROCEDURE — 25010000002 METHYLPREDNISOLONE PER 125 MG: Performed by: EMERGENCY MEDICINE

## 2024-12-21 PROCEDURE — 0202U NFCT DS 22 TRGT SARS-COV-2: CPT | Performed by: EMERGENCY MEDICINE

## 2024-12-21 PROCEDURE — 80053 COMPREHEN METABOLIC PANEL: CPT | Performed by: EMERGENCY MEDICINE

## 2024-12-21 PROCEDURE — 93005 ELECTROCARDIOGRAM TRACING: CPT

## 2024-12-21 PROCEDURE — 94761 N-INVAS EAR/PLS OXIMETRY MLT: CPT

## 2024-12-21 PROCEDURE — 99285 EMERGENCY DEPT VISIT HI MDM: CPT

## 2024-12-21 PROCEDURE — 83605 ASSAY OF LACTIC ACID: CPT

## 2024-12-21 PROCEDURE — 71045 X-RAY EXAM CHEST 1 VIEW: CPT

## 2024-12-21 PROCEDURE — 96374 THER/PROPH/DIAG INJ IV PUSH: CPT

## 2024-12-21 PROCEDURE — 94799 UNLISTED PULMONARY SVC/PX: CPT

## 2024-12-21 PROCEDURE — 87040 BLOOD CULTURE FOR BACTERIA: CPT | Performed by: EMERGENCY MEDICINE

## 2024-12-21 PROCEDURE — 93005 ELECTROCARDIOGRAM TRACING: CPT | Performed by: EMERGENCY MEDICINE

## 2024-12-21 PROCEDURE — 94640 AIRWAY INHALATION TREATMENT: CPT

## 2024-12-21 PROCEDURE — 36415 COLL VENOUS BLD VENIPUNCTURE: CPT

## 2024-12-21 PROCEDURE — 85007 BL SMEAR W/DIFF WBC COUNT: CPT | Performed by: EMERGENCY MEDICINE

## 2024-12-21 RX ORDER — IPRATROPIUM BROMIDE AND ALBUTEROL SULFATE 2.5; .5 MG/3ML; MG/3ML
3 SOLUTION RESPIRATORY (INHALATION)
Status: DISCONTINUED | OUTPATIENT
Start: 2024-12-22 | End: 2024-12-22 | Stop reason: HOSPADM

## 2024-12-21 RX ORDER — SODIUM CHLORIDE 0.9 % (FLUSH) 0.9 %
10 SYRINGE (ML) INJECTION AS NEEDED
Status: DISCONTINUED | OUTPATIENT
Start: 2024-12-21 | End: 2024-12-22 | Stop reason: HOSPADM

## 2024-12-21 RX ORDER — ONDANSETRON 2 MG/ML
4 INJECTION INTRAMUSCULAR; INTRAVENOUS EVERY 6 HOURS PRN
Status: DISCONTINUED | OUTPATIENT
Start: 2024-12-21 | End: 2024-12-22 | Stop reason: HOSPADM

## 2024-12-21 RX ORDER — IPRATROPIUM BROMIDE AND ALBUTEROL SULFATE 2.5; .5 MG/3ML; MG/3ML
3 SOLUTION RESPIRATORY (INHALATION) ONCE
Status: COMPLETED | OUTPATIENT
Start: 2024-12-21 | End: 2024-12-21

## 2024-12-21 RX ORDER — DEXTROSE MONOHYDRATE, SODIUM CHLORIDE, AND POTASSIUM CHLORIDE 50; 1.49; 4.5 G/1000ML; G/1000ML; G/1000ML
100 INJECTION, SOLUTION INTRAVENOUS CONTINUOUS
Status: DISCONTINUED | OUTPATIENT
Start: 2024-12-22 | End: 2024-12-22

## 2024-12-21 RX ORDER — SODIUM CHLORIDE 0.9 % (FLUSH) 0.9 %
10 SYRINGE (ML) INJECTION EVERY 12 HOURS SCHEDULED
Status: DISCONTINUED | OUTPATIENT
Start: 2024-12-22 | End: 2024-12-22 | Stop reason: HOSPADM

## 2024-12-21 RX ORDER — SODIUM CHLORIDE 9 MG/ML
40 INJECTION, SOLUTION INTRAVENOUS AS NEEDED
Status: DISCONTINUED | OUTPATIENT
Start: 2024-12-21 | End: 2024-12-22 | Stop reason: HOSPADM

## 2024-12-21 RX ORDER — METHYLPREDNISOLONE SODIUM SUCCINATE 125 MG/2ML
125 INJECTION, POWDER, LYOPHILIZED, FOR SOLUTION INTRAMUSCULAR; INTRAVENOUS ONCE
Status: COMPLETED | OUTPATIENT
Start: 2024-12-21 | End: 2024-12-21

## 2024-12-21 RX ORDER — METHYLPREDNISOLONE SODIUM SUCCINATE 40 MG/ML
40 INJECTION, POWDER, LYOPHILIZED, FOR SOLUTION INTRAMUSCULAR; INTRAVENOUS EVERY 8 HOURS
Status: DISCONTINUED | OUTPATIENT
Start: 2024-12-22 | End: 2024-12-22 | Stop reason: HOSPADM

## 2024-12-21 RX ADMIN — SODIUM CHLORIDE, POTASSIUM CHLORIDE, SODIUM LACTATE AND CALCIUM CHLORIDE 1000 ML: 600; 310; 30; 20 INJECTION, SOLUTION INTRAVENOUS at 20:08

## 2024-12-21 RX ADMIN — METHYLPREDNISOLONE SODIUM SUCCINATE 125 MG: 125 INJECTION, POWDER, FOR SOLUTION INTRAMUSCULAR; INTRAVENOUS at 20:07

## 2024-12-21 RX ADMIN — IPRATROPIUM BROMIDE AND ALBUTEROL SULFATE 3 ML: .5; 3 SOLUTION RESPIRATORY (INHALATION) at 20:00

## 2024-12-22 ENCOUNTER — APPOINTMENT (OUTPATIENT)
Dept: CT IMAGING | Facility: HOSPITAL | Age: 44
End: 2024-12-22
Payer: MEDICAID

## 2024-12-22 VITALS
TEMPERATURE: 98.2 F | HEART RATE: 106 BPM | OXYGEN SATURATION: 94 % | WEIGHT: 299.16 LBS | RESPIRATION RATE: 24 BRPM | DIASTOLIC BLOOD PRESSURE: 87 MMHG | BODY MASS INDEX: 41.88 KG/M2 | SYSTOLIC BLOOD PRESSURE: 159 MMHG | HEIGHT: 71 IN

## 2024-12-22 LAB
ANION GAP SERPL CALCULATED.3IONS-SCNC: 13.9 MMOL/L (ref 5–15)
BASOPHILS # BLD AUTO: 0.01 10*3/MM3 (ref 0–0.2)
BASOPHILS NFR BLD AUTO: 0.1 % (ref 0–1.5)
BUN SERPL-MCNC: 15 MG/DL (ref 6–20)
BUN/CREAT SERPL: 13.8 (ref 7–25)
CALCIUM SPEC-SCNC: 8.9 MG/DL (ref 8.6–10.5)
CHLORIDE SERPL-SCNC: 98 MMOL/L (ref 98–107)
CO2 SERPL-SCNC: 23.1 MMOL/L (ref 22–29)
CREAT SERPL-MCNC: 1.09 MG/DL (ref 0.76–1.27)
DEPRECATED RDW RBC AUTO: 41 FL (ref 37–54)
EGFRCR SERPLBLD CKD-EPI 2021: 85.3 ML/MIN/1.73
EOSINOPHIL # BLD AUTO: 0 10*3/MM3 (ref 0–0.4)
EOSINOPHIL NFR BLD AUTO: 0 % (ref 0.3–6.2)
ERYTHROCYTE [DISTWIDTH] IN BLOOD BY AUTOMATED COUNT: 13.1 % (ref 12.3–15.4)
GLUCOSE SERPL-MCNC: 202 MG/DL (ref 65–99)
HBA1C MFR BLD: 5.93 % (ref 4.8–5.6)
HCT VFR BLD AUTO: 41.1 % (ref 37.5–51)
HGB BLD-MCNC: 12.9 G/DL (ref 13–17.7)
IMM GRANULOCYTES # BLD AUTO: 0.09 10*3/MM3 (ref 0–0.05)
IMM GRANULOCYTES NFR BLD AUTO: 0.6 % (ref 0–0.5)
LYMPHOCYTES # BLD AUTO: 0.87 10*3/MM3 (ref 0.7–3.1)
LYMPHOCYTES NFR BLD AUTO: 5.6 % (ref 19.6–45.3)
MCH RBC QN AUTO: 27.1 PG (ref 26.6–33)
MCHC RBC AUTO-ENTMCNC: 31.4 G/DL (ref 31.5–35.7)
MCV RBC AUTO: 86.3 FL (ref 79–97)
MONOCYTES # BLD AUTO: 0.31 10*3/MM3 (ref 0.1–0.9)
MONOCYTES NFR BLD AUTO: 2 % (ref 5–12)
NEUTROPHILS NFR BLD AUTO: 14.19 10*3/MM3 (ref 1.7–7)
NEUTROPHILS NFR BLD AUTO: 91.7 % (ref 42.7–76)
NRBC BLD AUTO-RTO: 0 /100 WBC (ref 0–0.2)
PLATELET # BLD AUTO: 238 10*3/MM3 (ref 140–450)
PMV BLD AUTO: 9 FL (ref 6–12)
POTASSIUM SERPL-SCNC: 4.1 MMOL/L (ref 3.5–5.2)
RBC # BLD AUTO: 4.76 10*6/MM3 (ref 4.14–5.8)
SODIUM SERPL-SCNC: 135 MMOL/L (ref 136–145)
WBC NRBC COR # BLD AUTO: 15.47 10*3/MM3 (ref 3.4–10.8)

## 2024-12-22 PROCEDURE — 94761 N-INVAS EAR/PLS OXIMETRY MLT: CPT

## 2024-12-22 PROCEDURE — 94664 DEMO&/EVAL PT USE INHALER: CPT

## 2024-12-22 PROCEDURE — 71250 CT THORAX DX C-: CPT

## 2024-12-22 PROCEDURE — 25010000002 CEFTRIAXONE PER 250 MG: Performed by: NURSE PRACTITIONER

## 2024-12-22 PROCEDURE — 94799 UNLISTED PULMONARY SVC/PX: CPT

## 2024-12-22 PROCEDURE — 85025 COMPLETE CBC W/AUTO DIFF WBC: CPT | Performed by: EMERGENCY MEDICINE

## 2024-12-22 PROCEDURE — 80048 BASIC METABOLIC PNL TOTAL CA: CPT | Performed by: EMERGENCY MEDICINE

## 2024-12-22 PROCEDURE — G0378 HOSPITAL OBSERVATION PER HR: HCPCS

## 2024-12-22 PROCEDURE — 94618 PULMONARY STRESS TESTING: CPT

## 2024-12-22 PROCEDURE — 96361 HYDRATE IV INFUSION ADD-ON: CPT

## 2024-12-22 PROCEDURE — 96376 TX/PRO/DX INJ SAME DRUG ADON: CPT

## 2024-12-22 PROCEDURE — 25010000002 METHYLPREDNISOLONE PER 40 MG: Performed by: EMERGENCY MEDICINE

## 2024-12-22 PROCEDURE — 83036 HEMOGLOBIN GLYCOSYLATED A1C: CPT | Performed by: NURSE PRACTITIONER

## 2024-12-22 RX ORDER — CEFDINIR 300 MG/1
300 CAPSULE ORAL 2 TIMES DAILY
Qty: 10 CAPSULE | Refills: 0 | Status: SHIPPED | OUTPATIENT
Start: 2024-12-22 | End: 2024-12-27

## 2024-12-22 RX ORDER — MONTELUKAST SODIUM 10 MG/1
10 TABLET ORAL NIGHTLY
Status: DISCONTINUED | OUTPATIENT
Start: 2024-12-22 | End: 2024-12-22 | Stop reason: HOSPADM

## 2024-12-22 RX ORDER — BUDESONIDE AND FORMOTEROL FUMARATE DIHYDRATE 160; 4.5 UG/1; UG/1
2 AEROSOL RESPIRATORY (INHALATION) DAILY
Status: DISCONTINUED | OUTPATIENT
Start: 2024-12-22 | End: 2024-12-22 | Stop reason: HOSPADM

## 2024-12-22 RX ORDER — BENZONATATE 100 MG/1
100 CAPSULE ORAL 3 TIMES DAILY PRN
Status: DISCONTINUED | OUTPATIENT
Start: 2024-12-22 | End: 2024-12-22 | Stop reason: HOSPADM

## 2024-12-22 RX ORDER — ALBUTEROL SULFATE 0.83 MG/ML
2.5 SOLUTION RESPIRATORY (INHALATION) EVERY 6 HOURS PRN
Status: DISCONTINUED | OUTPATIENT
Start: 2024-12-22 | End: 2024-12-22 | Stop reason: HOSPADM

## 2024-12-22 RX ORDER — CELECOXIB 200 MG/1
200 CAPSULE ORAL DAILY
Status: DISCONTINUED | OUTPATIENT
Start: 2024-12-22 | End: 2024-12-22 | Stop reason: HOSPADM

## 2024-12-22 RX ORDER — METHYLPREDNISOLONE 4 MG/1
TABLET ORAL
Qty: 21 TABLET | Refills: 0 | Status: SHIPPED | OUTPATIENT
Start: 2024-12-22

## 2024-12-22 RX ORDER — MONTELUKAST SODIUM 10 MG/1
10 TABLET ORAL NIGHTLY
Status: DISCONTINUED | OUTPATIENT
Start: 2024-12-22 | End: 2024-12-22

## 2024-12-22 RX ORDER — LISINOPRIL 20 MG/1
40 TABLET ORAL DAILY
Status: DISCONTINUED | OUTPATIENT
Start: 2024-12-22 | End: 2024-12-22 | Stop reason: HOSPADM

## 2024-12-22 RX ORDER — AMLODIPINE BESYLATE 5 MG/1
10 TABLET ORAL DAILY
Status: DISCONTINUED | OUTPATIENT
Start: 2024-12-22 | End: 2024-12-22 | Stop reason: HOSPADM

## 2024-12-22 RX ORDER — AZITHROMYCIN 500 MG/1
500 TABLET, FILM COATED ORAL DAILY
Qty: 3 TABLET | Refills: 0 | Status: SHIPPED | OUTPATIENT
Start: 2024-12-22 | End: 2024-12-25

## 2024-12-22 RX ORDER — BENZONATATE 100 MG/1
100 CAPSULE ORAL 3 TIMES DAILY PRN
Qty: 15 CAPSULE | Refills: 0 | Status: SHIPPED | OUTPATIENT
Start: 2024-12-22

## 2024-12-22 RX ADMIN — CEFTRIAXONE 2000 MG: 2 INJECTION, POWDER, FOR SOLUTION INTRAMUSCULAR; INTRAVENOUS at 15:50

## 2024-12-22 RX ADMIN — IPRATROPIUM BROMIDE AND ALBUTEROL SULFATE 3 ML: .5; 3 SOLUTION RESPIRATORY (INHALATION) at 16:08

## 2024-12-22 RX ADMIN — METHYLPREDNISOLONE SODIUM SUCCINATE 40 MG: 40 INJECTION, POWDER, FOR SOLUTION INTRAMUSCULAR; INTRAVENOUS at 04:55

## 2024-12-22 RX ADMIN — AMLODIPINE BESYLATE 10 MG: 5 TABLET ORAL at 09:07

## 2024-12-22 RX ADMIN — MONTELUKAST 10 MG: 10 TABLET, FILM COATED ORAL at 00:30

## 2024-12-22 RX ADMIN — IPRATROPIUM BROMIDE AND ALBUTEROL SULFATE 3 ML: .5; 3 SOLUTION RESPIRATORY (INHALATION) at 08:14

## 2024-12-22 RX ADMIN — Medication 10 ML: at 09:07

## 2024-12-22 RX ADMIN — IPRATROPIUM BROMIDE AND ALBUTEROL SULFATE 3 ML: .5; 3 SOLUTION RESPIRATORY (INHALATION) at 03:15

## 2024-12-22 RX ADMIN — IPRATROPIUM BROMIDE AND ALBUTEROL SULFATE 3 ML: .5; 3 SOLUTION RESPIRATORY (INHALATION) at 00:00

## 2024-12-22 RX ADMIN — POTASSIUM CHLORIDE, DEXTROSE MONOHYDRATE AND SODIUM CHLORIDE 100 ML/HR: 150; 5; 450 INJECTION, SOLUTION INTRAVENOUS at 00:28

## 2024-12-22 RX ADMIN — METHYLPREDNISOLONE SODIUM SUCCINATE 40 MG: 40 INJECTION, POWDER, FOR SOLUTION INTRAMUSCULAR; INTRAVENOUS at 12:52

## 2024-12-22 RX ADMIN — LISINOPRIL 40 MG: 20 TABLET ORAL at 09:07

## 2024-12-22 RX ADMIN — BUDESONIDE AND FORMOTEROL FUMARATE DIHYDRATE 2 PUFF: 160; 4.5 AEROSOL RESPIRATORY (INHALATION) at 08:17

## 2024-12-22 RX ADMIN — CELECOXIB 200 MG: 200 CAPSULE ORAL at 09:07

## 2024-12-22 NOTE — PROCEDURES
Exercise Oximetry    Patient Name:Bruno Garcia   MRN: [id number]   Date: 12/22/24             ROOM AIR BASELINE   SpO2% 90   Heart Rate 85   Blood Pressure      EXERCISE ON ROOM AIR SpO2% EXERCISE ON O2 @ 2 LPM SpO2%   1 MINUTE 90 1 MINUTE    2 MINUTES 91 2 MINUTES    3 MINUTES 87 3 MINUTES Placed on 2 liters   4 MINUTES  4 MINUTES 88% on 2 liters   5 MINUTES  5 MINUTES 90% on 2 liters   6 MINUTES  6 MINUTES 93% on 2 liters              Distance Walked   Distance Walked 6 minutes   Dyspnea (Cari Scale)   Dyspnea (Cari Scale)   Fatigue (Cari Scale)   Fatigue (Cari Scale)   SpO2% Post Exercise   SpO2% Post Exercise 94% on 2 liters   HR Post Exercise   HR Post Exercise  106   Time to Recovery   Time to Recovery     Comments: Ambulated in paiz for 6 minutes.

## 2024-12-22 NOTE — PLAN OF CARE
Problem: Sepsis/Septic Shock  Goal: Optimal Coping  12/22/2024 0432 by Ileana Mata RN  Outcome: Progressing  12/22/2024 0014 by Ileana Mata RN  Outcome: Progressing  Intervention: Support Patient and Family Response  Recent Flowsheet Documentation  Taken 12/22/2024 0400 by Ileana Mata RN  Supportive Measures: active listening utilized  Family/Support System Care:   self-care encouraged   support provided  Taken 12/22/2024 0200 by Ileana Mata RN  Supportive Measures: active listening utilized  Family/Support System Care:   self-care encouraged   support provided  Goal: Absence of Bleeding  12/22/2024 0432 by Ileana Mata RN  Outcome: Progressing  12/22/2024 0014 by Ileana Mata RN  Outcome: Progressing  Goal: Blood Glucose Level Within Target Range  12/22/2024 0432 by Ileana Mata RN  Outcome: Progressing  12/22/2024 0014 by Ileana Mata RN  Outcome: Progressing  Goal: Absence of Infection Signs and Symptoms  12/22/2024 0432 by Ileana Mata RN  Outcome: Progressing  12/22/2024 0014 by Ileana Mata RN  Outcome: Progressing  Intervention: Initiate Sepsis Management  Recent Flowsheet Documentation  Taken 12/22/2024 0400 by Ileana Mata RN  Infection Prevention:   rest/sleep promoted   single patient room provided  Isolation Precautions:   enhanced contact   droplet   precautions maintained  Taken 12/22/2024 0300 by Ileana Mata RN  Infection Prevention:   rest/sleep promoted   single patient room provided  Isolation Precautions:   enhanced contact   droplet   precautions maintained  Taken 12/22/2024 0200 by Ileana Mata RN  Infection Prevention:   rest/sleep promoted   single patient room provided  Isolation Precautions:   droplet   precautions initiated  Intervention: Promote Recovery  Recent Flowsheet Documentation  Taken 12/22/2024 0400 by Ileana Mata RN  Activity Management: up ad laura  Taken 12/22/2024 0300 by Ileana Mata RN  Activity  Management: up ad laura  Taken 12/22/2024 0200 by Ileana Mata RN  Activity Management: up ad laura  Sleep/Rest Enhancement: awakenings minimized  Goal: Optimal Nutrition Delivery  12/22/2024 0432 by Ileana Mata RN  Outcome: Progressing  12/22/2024 0014 by Ileana Mata RN  Outcome: Progressing     Problem: Adult Inpatient Plan of Care  Goal: Plan of Care Review  12/22/2024 0432 by Ileana Mata RN  Outcome: Progressing  Flowsheets (Taken 12/22/2024 0432)  Progress: improving  Plan of Care Reviewed With: patient  12/22/2024 0014 by Ileana Mata RN  Outcome: Progressing  Flowsheets (Taken 12/22/2024 0014)  Progress: improving  Plan of Care Reviewed With: patient  Goal: Patient-Specific Goal (Individualized)  12/22/2024 0432 by Ileana Mata RN  Outcome: Progressing  12/22/2024 0014 by Ileana Mata RN  Outcome: Progressing  Goal: Absence of Hospital-Acquired Illness or Injury  12/22/2024 0432 by Ileana Mata RN  Outcome: Progressing  12/22/2024 0014 by Ileana Mata RN  Outcome: Progressing  Intervention: Identify and Manage Fall Risk  Recent Flowsheet Documentation  Taken 12/22/2024 0400 by Ileana Mata RN  Safety Promotion/Fall Prevention:   activity supervised   clutter free environment maintained   fall prevention program maintained   lighting adjusted   nonskid shoes/slippers when out of bed   room organization consistent   safety round/check completed  Taken 12/22/2024 0300 by Ileana Mata RN  Safety Promotion/Fall Prevention:   activity supervised   clutter free environment maintained   fall prevention program maintained   lighting adjusted   nonskid shoes/slippers when out of bed   room organization consistent   safety round/check completed  Taken 12/22/2024 0200 by Ileana Mata RN  Safety Promotion/Fall Prevention:   activity supervised   clutter free environment maintained   fall prevention program maintained   lighting adjusted   nonskid shoes/slippers  when out of bed   room organization consistent   safety round/check completed  Intervention: Prevent Skin Injury  Recent Flowsheet Documentation  Taken 12/22/2024 0400 by Ileana Mata RN  Body Position: position changed independently  Taken 12/22/2024 0300 by Ileana Mata RN  Body Position: position changed independently  Taken 12/22/2024 0200 by Ileana Mata RN  Body Position: position changed independently  Intervention: Prevent Infection  Recent Flowsheet Documentation  Taken 12/22/2024 0400 by Ileana Mata RN  Infection Prevention:   rest/sleep promoted   single patient room provided  Taken 12/22/2024 0300 by Ileana Mata RN  Infection Prevention:   rest/sleep promoted   single patient room provided  Taken 12/22/2024 0200 by Ileana Mata RN  Infection Prevention:   rest/sleep promoted   single patient room provided  Goal: Optimal Comfort and Wellbeing  12/22/2024 0432 by Ileana Mata RN  Outcome: Progressing  12/22/2024 0014 by Ileana Mata RN  Outcome: Progressing  Intervention: Monitor Pain and Promote Comfort  Recent Flowsheet Documentation  Taken 12/22/2024 0400 by Ileana Mata RN  Pain Management Interventions: pillow support provided  Taken 12/22/2024 0200 by Ileana Mata RN  Pain Management Interventions: pillow support provided  Intervention: Provide Person-Centered Care  Recent Flowsheet Documentation  Taken 12/22/2024 0400 by Ileana Mata RN  Trust Relationship/Rapport:   care explained   questions answered   questions encouraged  Taken 12/22/2024 0200 by Ileana Mata RN  Trust Relationship/Rapport:   care explained   questions answered   questions encouraged  Goal: Readiness for Transition of Care  12/22/2024 0432 by Ileana Mata RN  Outcome: Progressing  12/22/2024 0014 by Ileana Mata RN  Outcome: Progressing  Intervention: Mutually Develop Transition Plan  Recent Flowsheet Documentation  Taken 12/22/2024 0020 by Ileana Mata  RN  Transportation Anticipated: family or friend will provide  Patient/Family Anticipated Services at Transition: none  Patient/Family Anticipates Transition to: home with family  Taken 12/22/2024 0015 by Ileana Mata RN  Equipment Currently Used at Home: none     Problem: Chest Pain  Goal: Resolution of Chest Pain Symptoms  12/22/2024 0432 by Ileana Mata RN  Outcome: Progressing  12/22/2024 0014 by Ileana Mata RN  Outcome: Progressing     Problem: Electrolyte Imbalance  Goal: Electrolyte Balance  12/22/2024 0432 by Ileana Mata RN  Outcome: Progressing  12/22/2024 0014 by Ileana Mata RN  Outcome: Progressing     Problem: Fall Injury Risk  Goal: Absence of Fall and Fall-Related Injury  12/22/2024 0432 by Ileana Mata RN  Outcome: Progressing  12/22/2024 0014 by Ileana Mata RN  Outcome: Progressing  Intervention: Identify and Manage Contributors  Recent Flowsheet Documentation  Taken 12/22/2024 0400 by Ileana Mata RN  Medication Review/Management: medications reviewed  Taken 12/22/2024 0300 by Ileana Mata RN  Medication Review/Management: medications reviewed  Taken 12/22/2024 0200 by Ileana Mata RN  Medication Review/Management: medications reviewed  Intervention: Promote Injury-Free Environment  Recent Flowsheet Documentation  Taken 12/22/2024 0400 by Ileana Mata RN  Safety Promotion/Fall Prevention:   activity supervised   clutter free environment maintained   fall prevention program maintained   lighting adjusted   nonskid shoes/slippers when out of bed   room organization consistent   safety round/check completed  Taken 12/22/2024 0300 by Ileana Mata RN  Safety Promotion/Fall Prevention:   activity supervised   clutter free environment maintained   fall prevention program maintained   lighting adjusted   nonskid shoes/slippers when out of bed   room organization consistent   safety round/check completed  Taken 12/22/2024 0200 by Esperanza  GAYATRI Tejeda  Safety Promotion/Fall Prevention:   activity supervised   clutter free environment maintained   fall prevention program maintained   lighting adjusted   nonskid shoes/slippers when out of bed   room organization consistent   safety round/check completed     Problem: Comorbidity Management  Goal: Maintenance of Asthma Control  Outcome: Progressing  Intervention: Maintain Asthma Symptom Control  Recent Flowsheet Documentation  Taken 12/22/2024 0400 by Ileana Mata RN  Medication Review/Management: medications reviewed  Taken 12/22/2024 0300 by Ileana Mata RN  Medication Review/Management: medications reviewed  Taken 12/22/2024 0200 by Ileana Mata RN  Medication Review/Management: medications reviewed  Goal: Maintenance of Behavioral Health Symptom Control  Outcome: Progressing  Intervention: Maintain Behavioral Health Symptom Control  Recent Flowsheet Documentation  Taken 12/22/2024 0400 by Ileana Mata RN  Medication Review/Management: medications reviewed  Taken 12/22/2024 0300 by Ileana Mata RN  Medication Review/Management: medications reviewed  Taken 12/22/2024 0200 by Ileana Mata RN  Medication Review/Management: medications reviewed  Goal: Maintenance of COPD Symptom Control  Outcome: Progressing  Intervention: Maintain COPD (Chronic Obstructive Pulmonary Disease) Symptom Control  Recent Flowsheet Documentation  Taken 12/22/2024 0400 by Ileana Mata RN  Medication Review/Management: medications reviewed  Taken 12/22/2024 0300 by Ileana Mata RN  Medication Review/Management: medications reviewed  Taken 12/22/2024 0200 by Ileana Mata RN  Medication Review/Management: medications reviewed  Goal: Blood Glucose Level Within Target Range  Outcome: Progressing  Intervention: Monitor and Manage Glycemia  Recent Flowsheet Documentation  Taken 12/22/2024 0400 by Ileana Mata RN  Medication Review/Management: medications reviewed  Taken 12/22/2024 0300  by Ileana Mata RN  Medication Review/Management: medications reviewed  Taken 12/22/2024 0200 by Ileana Mata RN  Medication Review/Management: medications reviewed  Goal: Maintenance of Heart Failure Symptom Control  Outcome: Progressing  Intervention: Maintain Heart Failure Management  Recent Flowsheet Documentation  Taken 12/22/2024 0400 by Ileana Mata RN  Medication Review/Management: medications reviewed  Taken 12/22/2024 0300 by Ileana Mata RN  Medication Review/Management: medications reviewed  Taken 12/22/2024 0200 by Ileana Mata RN  Medication Review/Management: medications reviewed  Goal: Blood Pressure in Desired Range  Outcome: Progressing  Intervention: Maintain Blood Pressure Management  Recent Flowsheet Documentation  Taken 12/22/2024 0400 by Ileana Mata RN  Medication Review/Management: medications reviewed  Taken 12/22/2024 0300 by Ileana Mata RN  Medication Review/Management: medications reviewed  Taken 12/22/2024 0200 by Ileana Mata RN  Medication Review/Management: medications reviewed  Goal: Maintenance of Osteoarthritis Symptom Control  Outcome: Progressing  Intervention: Maintain Osteoarthritis Symptom Control  Recent Flowsheet Documentation  Taken 12/22/2024 0400 by Ileana Mata RN  Activity Management: up ad laura  Adaptive Equipment Use: used independently  Medication Review/Management: medications reviewed  Taken 12/22/2024 0300 by Ileana Mata RN  Activity Management: up ad laura  Medication Review/Management: medications reviewed  Taken 12/22/2024 0200 by Ileana Mata RN  Activity Management: up ad laura  Adaptive Equipment Use: used independently  Medication Review/Management: medications reviewed  Goal: Bariatric Home Regimen Maintained  Outcome: Progressing  Intervention: Maintain and Manage Postbariatric Surgery Care  Recent Flowsheet Documentation  Taken 12/22/2024 0400 by Ileana Maat RN  Medication Review/Management:  medications reviewed  Taken 12/22/2024 0300 by Ileana Mata RN  Medication Review/Management: medications reviewed  Taken 12/22/2024 0200 by Ileana Mata RN  Medication Review/Management: medications reviewed  Goal: Maintenance of Seizure Control  Outcome: Progressing  Intervention: Maintain Seizure Symptom Control  Recent Flowsheet Documentation  Taken 12/22/2024 0400 by Ileana Mata RN  Medication Review/Management: medications reviewed  Taken 12/22/2024 0300 by Ileana Mata RN  Medication Review/Management: medications reviewed  Taken 12/22/2024 0200 by Ileana Mata RN  Medication Review/Management: medications reviewed   Goal Outcome Evaluation:  Plan of Care Reviewed With: patient        Progress: improving

## 2024-12-22 NOTE — PLAN OF CARE
Goal Outcome Evaluation:         Reviewed discharge instructions with patient.

## 2024-12-22 NOTE — PLAN OF CARE
Problem: Sepsis/Septic Shock  Goal: Optimal Coping  Outcome: Progressing  Goal: Absence of Bleeding  Outcome: Progressing  Goal: Blood Glucose Level Within Target Range  Outcome: Progressing  Goal: Absence of Infection Signs and Symptoms  Outcome: Progressing  Goal: Optimal Nutrition Delivery  Outcome: Progressing     Problem: Adult Inpatient Plan of Care  Goal: Plan of Care Review  Outcome: Progressing  Flowsheets (Taken 12/22/2024 0014)  Progress: improving  Plan of Care Reviewed With: patient  Goal: Patient-Specific Goal (Individualized)  Outcome: Progressing  Goal: Absence of Hospital-Acquired Illness or Injury  Outcome: Progressing  Goal: Optimal Comfort and Wellbeing  Outcome: Progressing  Goal: Readiness for Transition of Care  Outcome: Progressing     Problem: Chest Pain  Goal: Resolution of Chest Pain Symptoms  Outcome: Progressing     Problem: Electrolyte Imbalance  Goal: Electrolyte Balance  Outcome: Progressing     Problem: Fall Injury Risk  Goal: Absence of Fall and Fall-Related Injury  Outcome: Progressing   Goal Outcome Evaluation:  Plan of Care Reviewed With: patient        Progress: improving

## 2024-12-22 NOTE — CASE MANAGEMENT/SOCIAL WORK
Continued Stay Note   Sen     Patient Name: Bruno Garcia  MRN: [id number]  Today's Date: 12/22/2024    Admit Date: 12/21/2024    Plan: Return home with new o2. Family to provide transportation.   Discharge Plan       Row Name 12/22/24 6859       Plan    Plan Return home with new o2. Family to provide transportation.    Patient/Family in Agreement with Plan yes    Plan Comments CM received message from Marily MATHUR stating pt has new need for home o2. Pt completed walking oximetry test. O2 orders placed. CM placed referral for [facility] in epic basket and notified answering service. CM received return call from [facility] confirming receipt of referral and orders. CM delivered o2 tank to pt room and provided instructions on calling [facility] when he returns home to have portable supplies delivered.             Expected Discharge Date and Time       Expected Discharge Date Expected Discharge Time    Dec 22, 2024           Katherine Glez RN     Office Phone: [phone]  Office Cell: [phone]

## 2024-12-22 NOTE — DISCHARGE PLACEMENT REQUEST
"Leonor Suggs (44 y.o. Male)       Date of Birth   1980    Social Security Number       Address   [address] [place] [address] [place] [address]    Home Phone   [phone]    MRN   [id number]       Ep[employer]alian   [employer]ism    Marital Status                               Admission Date   12/21/24    Admission Type   Emergency    Admitting Provider   Nicolas Henderson MD    Attending Provider   Nicolas Henderson MD    Department, Room/Bed   [facility] OBSERVATION, 225/1       Discharge Date       Discharge Disposition       Discharge Destination                                 Attending Provider: Nicolas Henderson MD    Allergies: Molds & Smuts, Penicillins    Isolation: Droplet   Infection: Influenza (12/21/24)   Code Status: CPR    Ht: 180.3 cm (71\")   Wt: 136 kg (299 lb 2.6 oz)    Admission Cmt: None   Principal Problem: Multifocal pneumonia [J18.9]                   Active Insurance as of 12/21/2024       Primary Coverage       Payor Plan Insurance Group Employer/Plan Group    [id number]       Payor Plan Address Payor Plan Phone Number Payor Plan Fax Number Effective Dates    MAIL STOP:   6/1/2023 - None Entered    [address] [address]       [facility] [id number]         Subscriber Name Subscriber Birth Date Member ID       LEONOR SUGGS 1980 [id number]                     Emergency Contacts        (Rel.) Home Phone Work Phone Mobile Phone    carlos suggs (Spouse) -- -- [phone]          "

## 2024-12-22 NOTE — DISCHARGE SUMMARY
[facility]    Leeanne Junior PA    CHIEF COMPLAINT:     Cough and congestion    HISTORY OF PRESENT ILLNESS:    Flu Symptoms      ED 12/21/2024  44-year-old male diagnosed as having influenza A and B on Monday.  He states that he was told to take Tylenol and ibuprofen.  He has a history of asthma but was not started on steroids.  He states that he he was not given Tamiflu prescription.  Reports he is felt progressively worse, especially last 24 hours she states his cough is now productive of green sputum.  He reports that he is can have subjective fever and chills throughout the week.  He felt very short of breath with just walking to the bathroom today.  He reports no calf pain or swelling.  He states symptomatology started with muscle aches and pain     Observation 12/22/2024  Patient agrees with HPI noted above including pain diagnosed with influenza A and PE on Monday.  He reports that his whole family is sick with similar symptoms and his kids also have pneumonia.  He feels better since having steroids and is eager for discharge.     Past Medical History:   Diagnosis Date    Asthma     Hypertension      Past Surgical History:   Procedure Laterality Date    ENDOSCOPY N/A 05/26/2023    Procedure: ESOPHAGOGASTRODUODENOSCOPY;  Surgeon: Boni Campbell MD;  Location: [facility] ENDOSCOPY;  Service: Gastroenterology;  Laterality: N/A;  anastamotic ulcer    HAND SURGERY Right 1995    LAPAROSCOPIC GASTRIC BYPASS N/A      History reviewed. No pertinent family history.  Social History     Tobacco Use    Smoking status: Never     Passive exposure: Never    Smokeless tobacco: Never   Vaping Use    Vaping status: Never Used   Substance Use Topics    Alcohol use: Yes     Comment: rarely    Drug use: Never     Medications Prior to Admission   Medication Sig Dispense Refill Last Dose/Taking    albuterol (PROVENTIL) (2.5 MG/3ML) 0.083% nebulizer solution Take 2.5 mg by nebulization Every 6 (Six)  Hours As Needed for Wheezing or Shortness of Air.   Taking As Needed    albuterol sulfate  (90 Base) MCG/ACT inhaler Inhale 2 puffs Every 4 (Four) Hours As Needed for Wheezing.   12/21/2024 Morning    amLODIPine (NORVASC) 10 MG tablet Take 1 tablet by mouth Daily.   12/21/2024 Morning    CeleBREX 200 MG capsule Take 1 capsule by mouth Daily.   12/21/2024 Morning    lisinopril (PRINIVIL,ZESTRIL) 40 MG tablet Take 1 tablet by mouth Daily.   12/21/2024 Morning    montelukast (SINGULAIR) 10 MG tablet Take 1 tablet by mouth every night at bedtime.   12/20/2024 Evening    Bivzjothv-JWQ-VY-APAP (THERAFLU FLU/COLD/COUGH PO) Take 2 tablets by mouth Daily As Needed.   12/21/2024 Morning    Symbicort 160-4.5 MCG/ACT inhaler Inhale 2 puffs Daily.   12/21/2024 Morning     Allergies:  Molds & smuts and Penicillins      There is no immunization history on file for this patient.        REVIEW OF SYSTEMS:    ROS  Review of Systems   Constitutional:  Positive for chills, fatigue and fever.   Respiratory:  Positive for cough, chest tightness, shortness of breath and wheezing.    Cardiovascular:  Negative for leg swelling.   Hematological:  Does not bruise/bleed easily.   All other systems reviewed and are negative.       Vital Signs  Temp:  [97.9 °F (36.6 °C)-103.2 °F (39.6 °C)] 98.2 °F (36.8 °C)  Heart Rate:  [] 106  Resp:  [16-24] 24  BP: (127-159)/(53-87) 159/87          Physical Exam:  Physical Exam  Vitals and nursing note reviewed.   Constitutional:       Appearance: Normal appearance.   HENT:      Head: Normocephalic and atraumatic.      Right Ear: External ear normal.      Left Ear: External ear normal.      Nose: Nose normal.      Mouth/Throat:      Mouth: Mucous membranes are moist.   Eyes:      Extraocular Movements: Extraocular movements intact.   Cardiovascular:      Rate and Rhythm: Normal rate and regular rhythm.      Pulses: Normal pulses.      Heart sounds: Normal heart sounds.   Pulmonary:      Effort:  Pulmonary effort is normal.      Breath sounds: Rales present.   Abdominal:      General: Abdomen is flat. Bowel sounds are normal.      Palpations: Abdomen is soft.   Musculoskeletal:         General: Normal range of motion.      Cervical back: Normal range of motion.   Skin:     General: Skin is warm.   Neurological:      Mental Status: He is alert and oriented to person, place, and time.   Psychiatric:         Behavior: Behavior normal.         Thought Content: Thought content normal.         Judgment: Judgment normal.           Emotional Behavior:    Normal    Debilities:   None  Results Review:    I reviewed the patient's new clinical results.  Lab Results (most recent)       Procedure Component Value Units Date/Time    Hemoglobin A1c [[id number]]  (Abnormal) Collected: 12/22/24 0034    Specimen: Blood from Arm, Right Updated: 12/22/24 0816     Hemoglobin A1C 5.93 %     Basic Metabolic Panel [[id number]]  (Abnormal) Collected: 12/22/24 0034    Specimen: Blood from Arm, Right Updated: 12/22/24 0157     Glucose 202 mg/dL      BUN 15 mg/dL      Creatinine 1.09 mg/dL      Sodium 135 mmol/L      Potassium 4.1 mmol/L      Comment: Specimen hemolyzed.  Result may be falsely elevated.        Chloride 98 mmol/L      CO2 23.1 mmol/L      Calcium 8.9 mg/dL      BUN/Creatinine Ratio 13.8     Anion Gap 13.9 mmol/L      eGFR 85.3 mL/min/1.73     Narrative:      GFR Categories in Chronic Kidney Disease (CKD)      GFR Category          GFR (mL/min/1.73)    Interpretation  G1                     90 or greater         Normal or high (1)  G2                      60-89                Mild decrease (1)  G3a                   45-59                Mild to moderate decrease  G3b                   30-44                Moderate to severe decrease  G4                    15-29                Severe decrease  G5                    14 or less           Kidney failure          (1)In the absence of evidence of kidney disease, neither GFR  category G1 or G2 fulfill the criteria for CKD.    eGFR calculation 2021 CKD-EPI creatinine equation, which does not include race as a factor    CBC Auto Differential [[id number]]  (Abnormal) Collected: 12/22/24 0034    Specimen: Blood from Arm, Right Updated: 12/22/24 0132     WBC 15.47 10*3/mm3      RBC 4.76 10*6/mm3      Hemoglobin 12.9 g/dL      Hematocrit 41.1 %      MCV 86.3 fL      MCH 27.1 pg      MCHC 31.4 g/dL      RDW 13.1 %      RDW-SD 41.0 fl      MPV 9.0 fL      Platelets 238 10*3/mm3      Neutrophil % 91.7 %      Lymphocyte % 5.6 %      Monocyte % 2.0 %      Eosinophil % 0.0 %      Basophil % 0.1 %      Immature Grans % 0.6 %      Neutrophils, Absolute 14.19 10*3/mm3      Lymphocytes, Absolute 0.87 10*3/mm3      Monocytes, Absolute 0.31 10*3/mm3      Eosinophils, Absolute 0.00 10*3/mm3      Basophils, Absolute 0.01 10*3/mm3      Immature Grans, Absolute 0.09 10*3/mm3      nRBC 0.0 /100 WBC     Respiratory Panel PCR w/COVID-19(SARS-CoV-2) [facility]ATHAN/IGOR/PAMELA/PAD/COR/JEROME In-House, NP Swab in UTM/VTM, 2 HR TAT - Swab, Nasopharynx [[id number]]  (Abnormal) Collected: 12/21/24 2011    Specimen: Swab from Nasopharynx Updated: 12/21/24 2113     ADENOVIRUS, PCR Not Detected     Coronavirus 229E Not Detected     Coronavirus HKU1 Not Detected     Coronavirus NL63 Not Detected     Coronavirus OC43 Not Detected     COVID19 Not Detected     Human Metapneumovirus Not Detected     Human Rhinovirus/Enterovirus Not Detected     Influenza A H3 Detected     Influenza B PCR Not Detected     Parainfluenza Virus 1 Not Detected     Parainfluenza Virus 2 Not Detected     Parainfluenza Virus 3 Not Detected     Parainfluenza Virus 4 Not Detected     RSV, PCR Not Detected     Bordetella pertussis pcr Not Detected     Bordetella parapertussis PCR Not Detected     Chlamydophila pneumoniae PCR Not Detected     Mycoplasma pneumo by PCR Not Detected    Narrative:      In the setting of a positive respiratory panel with a viral infection PLUS a  negative procalcitonin without other underlying concern for bacterial infection, consider observing off antibiotics or discontinuation of antibiotics and continue supportive care. If the respiratory panel is positive for atypical bacterial infection (Bordetella pertussis, Chlamydophila pneumoniae, or Mycoplasma pneumoniae), consider antibiotic de-escalation to target atypical bacterial infection.    Manual Differential [[id number]]  (Abnormal) Collected: 12/21/24 1908    Specimen: Blood Updated: 12/21/24 1947     Neutrophil % 85.0 %      Lymphocyte % 5.0 %      Monocyte % 8.0 %      Bands %  2.0 %      Neutrophils Absolute 13.03 10*3/mm3      Lymphocytes Absolute 0.75 10*3/mm3      Monocytes Absolute 1.20 10*3/mm3      RBC Morphology Normal     WBC Morphology Normal     Platelet Morphology Normal    CBC & Differential [[id number]]  (Abnormal) Collected: 12/21/24 1908    Specimen: Blood Updated: 12/21/24 1947    Narrative:      The following orders were created for panel order CBC & Differential.  Procedure                               Abnormality         Status                     ---------                               -----------         ------                     CBC Auto Differential[[id number]]        Abnormal            Final result               Scan Slide[[id number]]                                       Final result                 Please view results for these tests on the individual orders.    CBC Auto Differential [[id number]]  (Abnormal) Collected: 12/21/24 1908    Specimen: Blood Updated: 12/21/24 1947     WBC 14.98 10*3/mm3      RBC 4.98 10*6/mm3      Hemoglobin 14.3 g/dL      Hematocrit 42.7 %      MCV 85.7 fL      MCH 28.7 pg      MCHC 33.5 g/dL      RDW 13.0 %      RDW-SD 40.5 fl      MPV 8.7 fL      Platelets 238 10*3/mm3     Narrative:      The previously reported component NRBC is no longer being reported. Previous result was 0.0 /100 WBC (Reference Range: 0.0-0.2 /100 WBC) on 12/21/2024 at 1918  EST.    Scan Slide [[id number]] Collected: 12/21/24 1908    Specimen: Blood Updated: 12/21/24 1947     Scan Slide --     Comment: See Manual Differential Results       Comprehensive Metabolic Panel [[id number]]  (Abnormal) Collected: 12/21/24 1908    Specimen: Blood Updated: 12/21/24 1936     Glucose 149 mg/dL      BUN 15 mg/dL      Creatinine 1.25 mg/dL      Sodium 134 mmol/L      Potassium 3.7 mmol/L      Chloride 97 mmol/L      CO2 21.7 mmol/L      Calcium 9.4 mg/dL      Total Protein 8.4 g/dL      Albumin 4.2 g/dL      ALT (SGPT) 22 U/L      AST (SGOT) 32 U/L      Alkaline Phosphatase 130 U/L      Total Bilirubin 0.6 mg/dL      Globulin 4.2 gm/dL      A/G Ratio 1.0 g/dL      BUN/Creatinine Ratio 12.0     Anion Gap 15.3 mmol/L      eGFR 72.4 mL/min/1.73     Narrative:      GFR Categories in Chronic Kidney Disease (CKD)      GFR Category          GFR (mL/min/1.73)    Interpretation  G1                     90 or greater         Normal or high (1)  G2                      60-89                Mild decrease (1)  G3a                   45-59                Mild to moderate decrease  G3b                   30-44                Moderate to severe decrease  G4                    15-29                Severe decrease  G5                    14 or less           Kidney failure          (1)In the absence of evidence of kidney disease, neither GFR category G1 or G2 fulfill the criteria for CKD.    eGFR calculation 2021 CKD-EPI creatinine equation, which does not include race as a factor    [place] Draw [[id number]] Collected: 12/21/24 1908    Specimen: Blood Updated: 12/21/24 1915    Narrative:      The following orders were created for panel order [place] Draw.  Procedure                               Abnormality         Status                     ---------                               -----------         ------                     Green Top (Gel)[[id number]]                                  Final result               Lavender  Top[[id number]]                                     Final result               Gold Top - SST[[id number]]                                   Final result               Light Blue Top[[id number]]                                   Final result                 Please view results for these tests on the individual orders.    Green Top (Gel) [[id number]] Collected: 12/21/24 1908    Specimen: Blood Updated: 12/21/24 1915     Extra Tube Hold for add-ons.     Comment: Auto resulted.       Lavender Top [[id number]] Collected: 12/21/24 1908    Specimen: Blood Updated: 12/21/24 1915     Extra Tube hold for add-on     Comment: Auto resulted       Gold Top - SST [[id number]] Collected: 12/21/24 1908    Specimen: Blood Updated: 12/21/24 1915     Extra Tube Hold for add-ons.     Comment: Auto resulted.       Light Blue Top [[id number]] Collected: 12/21/24 1908    Specimen: Blood from Arm, Left Updated: 12/21/24 1915     Extra Tube Hold for add-ons.     Comment: Auto resulted       Blood Culture - Blood, Arm, Right [[id number]] Collected: 12/21/24 1908    Specimen: Blood from Arm, Right Updated: 12/21/24 1914    Blood Culture - Blood, Arm, Left [[id number]] Collected: 12/21/24 1908    Specimen: Blood from Arm, Left Updated: 12/21/24 1914    POC Lactate [[id number]]  (Normal) Collected: 12/21/24 1912    Specimen: Blood Updated: 12/21/24 1914     Lactate 0.9 mmol/L      Comment: Serial Number: [id number]:  [id number]               Imaging Results (Most Recent)       Procedure Component Value Units Date/Time    CT Chest Without Contrast Diagnostic [[id number]] Collected: 12/22/24 1503     Updated: 12/22/24 1509    Narrative:      CT CHEST WO CONTRAST DIAGNOSTIC    Date of Exam: 12/22/2024 2:18 PM EST    Indication: r/o pneumonia.    Comparison: Chest x-ray 12/21/2024, CT chest 10/9/2023    Technique: Axial CT images were obtained of the chest without contrast administration.  Sagittal and coronal reconstructions were performed.  Automated  exposure control and iterative reconstruction methods were used.      Findings:  MEDIASTINUM: Unremarkable. Aortic and heart size are normal. No mass nor pericardial effusion.  CORONARY ARTERIES: No calcified atherosclerotic disease.  LUNGS: Patchy airspace and groundglass opacities more prominent in the right upper lobe consistent with multifocal infection. Multiple tree-in-bud nodules are also seen in the right lower lobe. There is an area of suspected rounded atelectasis in the   left lung base similar to prior.  PLEURAL SPACE: Small left pleural effusion is similar to prior with associated pleural thickening.  LYMPH NODES: There are no pathologically enlarged lymph nodes.    UPPER ABDOMEN: There are postsurgical changes of the stomach.    OSSEOUS STRUCTURES: Appropriate for age with no acute process identified.      Impression:      Impression:  Patchy airspace and groundglass nodular opacities along with multiple tree-in-bud nodules suggesting multifocal infection.    Stable chronic appearing small left pleural effusion with suspected round atelectasis in the left lower lobe.      Electronically Signed: Abigail Hargrove MD    12/22/2024 3:07 PM EST    Workstation ID: [id number]    XR Chest 1 View [[id number]] Collected: 12/21/24 2115     Updated: 12/21/24 2117    Narrative:      XR CHEST 1 VW    Date of Exam: 12/21/2024 8:36 PM EST    Indication: hypoxemia hx flu A    Comparison: None available.    Findings:  There is patchy right suprahilar and infrahilar airspace disease. Left lung appears clear. No pneumothorax or pleural effusion. Heart size and pulmonary vasculature appear within normal limits.      Impression:      Impression:  Patchy right-sided airspace disease concerning for pneumonia.          Electronically Signed: Ron Garduno MD    12/21/2024 9:15 PM EST    Workstation ID: [id number]          reviewed    ECG/EMG Results (most recent)       Procedure Component Value Units Date/Time    ECG 12 Lead  Tachycardia [[id number]] Collected: 12/21/24 1845     Updated: 12/21/24 1846     QT Interval 296 ms      QTC Interval 452 ms     Narrative:      HEART ICKO=760  bpm  RR Zztncghl=923  ms  MN Onqwgkdx=110  ms  P Horizontal Axis=11  deg  P Front Axis=66  deg  QRSD Interval=82  ms  QT Afwbqpss=813  ms  BJcS=639  ms  QRS Axis=32  deg  T Wave Axis=-10  deg  - OTHERWISE NORMAL ECG -  Sinus tachycardia  Date and Time of Study:2024-12-21 18:45:59          reviewed            Microbiology Results (last 10 days)       Procedure Component Value - Date/Time    Respiratory Panel PCR w/COVID-19(SARS-CoV-2) [facility]/PAMELA/[facility]OME In-House, NP Swab in UTM/VTM, 2 HR TAT - Swab, Nasopharynx [[id number]]  (Abnormal) Collected: 12/21/24 2011    Lab Status: Final result Specimen: Swab from Nasopharynx Updated: 12/21/24 2113     ADENOVIRUS, PCR Not Detected     Coronavirus 229E Not Detected     Coronavirus HKU1 Not Detected     Coronavirus NL63 Not Detected     Coronavirus OC43 Not Detected     COVID19 Not Detected     Human Metapneumovirus Not Detected     Human Rhinovirus/Enterovirus Not Detected     Influenza A H3 Detected     Influenza B PCR Not Detected     Parainfluenza Virus 1 Not Detected     Parainfluenza Virus 2 Not Detected     Parainfluenza Virus 3 Not Detected     Parainfluenza Virus 4 Not Detected     RSV, PCR Not Detected     Bordetella pertussis pcr Not Detected     Bordetella parapertussis PCR Not Detected     Chlamydophila pneumoniae PCR Not Detected     Mycoplasma pneumo by PCR Not Detected    Narrative:      In the setting of a positive respiratory panel with a viral infection PLUS a negative procalcitonin without other underlying concern for bacterial infection, consider observing off antibiotics or discontinuation of antibiotics and continue supportive care. If the respiratory panel is positive for atypical bacterial infection (Bordetella pertussis, Chlamydophila pneumoniae, or Mycoplasma pneumoniae), consider  antibiotic de-escalation to target atypical bacterial infection.            Assessment & Plan     Multifocal pneumonia       Multifocal pneumonia with asthma exacerbation  -CMP generally unremarkable  -Lactate 0.9  -CBC showed WBC 15.47 with increased neutrophils  -Chest x-ray showed possible pneumonia  -Respiratory panel positive for flu A  -CT chest showed multifocal pneumonia  -In the ED patient was given DuoNebs, and IV fluid bolus and Solu-Medrol  -Continue Singulair, Symbicort and albuterol  -IV ceftriaxone  -Medrol Dosepak cefdinir and Z-Ismael at discharge  -6-minute walk completed and patient required O2 2 L nasal cannula at discharge    Hypertension  -Moderate controlled   BP Readings from Last 1 Encounters:   12/22/24 159/87   - Continue amlodipine and lisinopril  - Monitor while admitted     Obesity  -BMI 41.72  -Lifestyle modifications    I discussed the patients findings and my recommendations with patient and nursing staff.     Discharge Diagnosis:      Multifocal pneumonia      Hospital Course  Patient is a 44 y.o. male presented with cough and shortness of breath as noted in HPI above.  CMP, lactate unremarkable.  CBC showed WBC 15.47.  His chest x-ray and CT chest showed multifocal pneumonia.  Respiratory panel was positive for flu A.  Patient was admitted to the observation unit for IV steroids and IV ceftriaxone.  A 6-minute walk was complete and he required O2 2 L nasal cannula discharge.At this time, patient felt to be in good condition for discharge with close follow up with PCP. He was discharged on medrol dosepack, cefdinir, zpack and tessalon perles. Instructed to take all medications as prescribed and to return to ED if any concerning signs/symptoms. All test/lab results were discussed with patient. All questions were answered and patient verbalizes understanding.   .      Past Medical History:     Past Medical History:   Diagnosis Date    Asthma     Hypertension        Past Surgical History:      Past Surgical History:   Procedure Laterality Date    ENDOSCOPY N/A 05/26/2023    Procedure: ESOPHAGOGASTRODUODENOSCOPY;  Surgeon: Boni Campbell MD;  Location: [facility] ENDOSCOPY;  Service: Gastroenterology;  Laterality: N/A;  anastamotic ulcer    HAND SURGERY Right 1995    LAPAROSCOPIC GASTRIC BYPASS N/A        Social History:   Social History     Socioeconomic History    Marital status:    Tobacco Use    Smoking status: Never     Passive exposure: Never    Smokeless tobacco: Never   Vaping Use    Vaping status: Never Used   Substance and Sexual Activity    Alcohol use: Yes     Comment: rarely    Drug use: Never    Sexual activity: Defer       Procedures Performed    12/22 1600 Walking Oximetry    Consults:   Consults       No orders found for last 30 day(s).            Condition on Discharge:     Stable    Discharge Disposition  Home or Self Care    Discharge Medications     Discharge Medications        New Medications        Instructions Start Date   azithromycin 500 MG tablet  Commonly known as: Zithromax   500 mg, Oral, Daily      benzonatate 100 MG capsule  Commonly known as: Tessalon Perles   100 mg, Oral, 3 Times Daily PRN      cefdinir 300 MG capsule  Commonly known as: OMNICEF   300 mg, Oral, 2 Times Daily      methylPREDNISolone 4 MG dose pack  Commonly known as: MEDROL   Take as directed on package instructions.             Continue These Medications        Instructions Start Date   albuterol sulfate  (90 Base) MCG/ACT inhaler  Commonly known as: PROVENTIL HFA;VENTOLIN HFA;PROAIR HFA   2 puffs, Every 4 Hours PRN      albuterol (2.5 MG/3ML) 0.083% nebulizer solution  Commonly known as: PROVENTIL   2.5 mg, Every 6 Hours PRN      amLODIPine 10 MG tablet  Commonly known as: NORVASC   10 mg, Daily      CeleBREX 200 MG capsule  Generic drug: celecoxib   200 mg, Daily      lisinopril 40 MG tablet  Commonly known as: PRINIVIL,ZESTRIL   1 tablet, Daily      montelukast 10 MG  tablet  Commonly known as: SINGULAIR   10 mg, Every Night at Bedtime      Symbicort 160-4.5 MCG/ACT inhaler  Generic drug: budesonide-formoterol   2 puffs, Daily             Stop These Medications      THERAFLU FLU/COLD/COUGH PO              Discharge Diet:   Diet Instructions       Diet: Regular/House Diet; Regular (IDDSI 7); Thin (IDDSI 0)      Discharge Diet: Regular/House Diet    Texture: Regular (IDDSI 7)    Fluid Consistency: Thin (IDDSI 0)            Activity at Discharge:     Follow-up Appointments  No future appointments.  Additional Instructions for the Follow-ups that You Need to Schedule       Discharge Follow-up with PCP   As directed       Currently Documented PCP:    Leeanne Junior PA    PCP Phone Number:    [phone]     Follow Up Details: 5-7 days                Test Results Pending at Discharge  Pending Results       Procedure [Order ID] Specimen - Date/Time    Blood Culture - Blood, Arm, Left [[id number]] Collected: 12/21/24 1908    Specimen: Blood from Arm, Left Updated: 12/21/24 1914    Blood Culture - Blood, Arm, Right [[id number]] Collected: 12/21/24 1908    Specimen: Blood from Arm, Right Updated: 12/21/24 1914             Risk for Readmission (LACE) Score: 3 (12/22/2024  6:00 AM)      Greater than 30 minutes spent in discharge activities for this patient    Signature:Electronically signed by KAREEN Aggarwal, 12/22/24, 4:41 PM EST.

## 2024-12-22 NOTE — ED PROVIDER NOTES
Subjective   History of Present Illness  44-year-old male diagnosed as having influenza A and B on Monday.  He states that he was told to take Tylenol and ibuprofen.  He has a history of asthma but was not started on steroids.  He states that he he was not given Tamiflu prescription.  Reports he is felt progressively worse, especially last 24 hours she states his cough is now productive of green sputum.  He reports that he is can have subjective fever and chills throughout the week.  He felt very short of breath with just walking to the bathroom today.  He reports no calf pain or swelling.  He states symptomatology started with muscle aches and pain      Review of Systems   Constitutional:  Positive for chills, fatigue and fever.   Respiratory:  Positive for cough, chest tightness, shortness of breath and wheezing.    Cardiovascular:  Negative for leg swelling.   Hematological:  Does not bruise/bleed easily.   All other systems reviewed and are negative.      Past Medical History:   Diagnosis Date    Asthma     Hypertension        Allergies   Allergen Reactions    Molds & Smuts Hives    Penicillins Hives       Past Surgical History:   Procedure Laterality Date    ENDOSCOPY N/A 05/26/2023    Procedure: ESOPHAGOGASTRODUODENOSCOPY;  Surgeon: Boni Campbell MD;  Location: [facility] ENDOSCOPY;  Service: Gastroenterology;  Laterality: N/A;  anastamotic ulcer    HAND SURGERY Right 1995    LAPAROSCOPIC GASTRIC BYPASS N/A        No family history on file.    Social History     Socioeconomic History    Marital status:    Tobacco Use    Smoking status: Never     Passive exposure: Never    Smokeless tobacco: Never   Vaping Use    Vaping status: Never Used   Substance and Sexual Activity    Alcohol use: Yes     Comment: rarely    Drug use: Never    Sexual activity: Defer     Family members also tested positive for influenza A.  No flu vaccine this year      Objective   Physical Exam  Alert Usha Coma Scale 15    HEENT: Pupils equal and reactive to light. Conjunctivae are not injected. Normal tympanic membranes. Oropharynx and nares are normal.   Neck: Supple. Midline trachea. No JVD. No goiter.   Chest: Right-sided Rales are noted patient has rhonchi and expiratory wheezes bilateral and equal breath sounds bilaterally, regular rate and rhythm without murmur or rub.   Abdomen: Positive bowel sounds, nontender, nondistended. No rebound or peritoneal signs. No CVA tenderness.   Extremities no clubbing. cyanosis or edema. Motor sensory exam is normal. The full range of motion is intact   Skin: Warm and dry, no rashes or petechia.   Lymphatic: No regional lymphadenopathy. No calf pain, swelling or Homans sign    Procedures           ED Course  ED Course as of 12/21/24 2203   Sat Dec 21, 2024   2023 ED overflow/overcrowding condition   [TH]      ED Course User Index  [TH] Nicolas Henderson MD        Labs Reviewed   RESPIRATORY PANEL PCR W/ COVID-19 (SARS-COV-2), NP SWAB IN UTM/VTP, 2 HR TAT - Abnormal; Notable for the following components:       Result Value    Influenza A H3 Detected (*)     All other components within normal limits    Narrative:     In the setting of a positive respiratory panel with a viral infection PLUS a negative procalcitonin without other underlying concern for bacterial infection, consider observing off antibiotics or discontinuation of antibiotics and continue supportive care. If the respiratory panel is positive for atypical bacterial infection (Bordetella pertussis, Chlamydophila pneumoniae, or Mycoplasma pneumoniae), consider antibiotic de-escalation to target atypical bacterial infection.   COMPREHENSIVE METABOLIC PANEL - Abnormal; Notable for the following components:    Glucose 149 (*)     Sodium 134 (*)     Chloride 97 (*)     CO2 21.7 (*)     Alkaline Phosphatase 130 (*)     Anion Gap 15.3 (*)     All other components within normal limits    Narrative:     GFR Categories in Chronic Kidney  Disease (CKD)      GFR Category          GFR (mL/min/1.73)    Interpretation  G1                     90 or greater         Normal or high (1)  G2                      60-89                Mild decrease (1)  G3a                   45-59                Mild to moderate decrease  G3b                   30-44                Moderate to severe decrease  G4                    15-29                Severe decrease  G5                    14 or less           Kidney failure          (1)In the absence of evidence of kidney disease, neither GFR category G1 or G2 fulfill the criteria for CKD.    eGFR calculation 2021 CKD-EPI creatinine equation, which does not include race as a factor   CBC WITH AUTO DIFFERENTIAL - Abnormal; Notable for the following components:    WBC 14.98 (*)     All other components within normal limits    Narrative:     The previously reported component NRBC is no longer being reported. Previous result was 0.0 /100 WBC (Reference Range: 0.0-0.2 /100 WBC) on 12/21/2024 at 1918 EST.   MANUAL DIFFERENTIAL - Abnormal; Notable for the following components:    Neutrophil % 85.0 (*)     Lymphocyte % 5.0 (*)     Neutrophils Absolute 13.03 (*)     Monocytes Absolute 1.20 (*)     All other components within normal limits   POC LACTATE - Normal   BLOOD CULTURE   BLOOD CULTURE   RAINBOW DRAW    Narrative:     The following orders were created for panel order [place] Draw.  Procedure                               Abnormality         Status                     ---------                               -----------         ------                     Green Top (Gel)[[id number]]                                  Final result               Lavender Top[[id number]]                                     Final result               Gold Top - SST[[id number]]                                   Final result               Light Blue Top[[id number]]                                   Final result                 Please view results for these  tests on the individual orders.   SCAN SLIDE   POC LACTATE   CBC AND DIFFERENTIAL    Narrative:     The following orders were created for panel order CBC & Differential.  Procedure                               Abnormality         Status                     ---------                               -----------         ------                     CBC Auto Differential[[id number]]        Abnormal            Final result               Scan Slide[[id number]]                                       Final result                 Please view results for these tests on the individual orders.   GREEN TOP   LAVENDER TOP   GOLD TOP - SST   LIGHT BLUE TOP     .EDS  XR Chest 1 View    Result Date: 12/21/2024  Impression: Patchy right-sided airspace disease concerning for pneumonia. Electronically Signed: Ron Garduno MD  12/21/2024 9:15 PM EST  Workstation ID: [id number]                                                  Medical Decision Making  The patient was hypoxemic in the emergency department the patient started on oxygen wheezing resolved after administration of DuoNeb.  The patient will be admitted for intravenous antibiotics and steroids as well as bronchodilators and oxygen support is likely the patient has a secondary bacterial infection.  He was agreeable with this plan of treatment    Amount and/or Complexity of Data Reviewed  Independent Historian: spouse  Labs: ordered. Decision-making details documented in ED Course.  Radiology: ordered and independent interpretation performed.  ECG/medicine tests: ordered.    Risk  OTC drugs.  Prescription drug management.  Decision regarding hospitalization.        Final diagnoses:   Multifocal pneumonia   Influenza due to influenza virus, type A, human   Moderate persistent asthma with exacerbation   Hypoxemia       ED Disposition  ED Disposition       ED Disposition   Decision to Admit    Condition   --    Comment   --               No follow-up provider specified.       Medication  List      No changes were made to your prescriptions during this visit.            Nicolas Henderson MD  12/21/24 [id number]

## 2024-12-23 LAB
QT INTERVAL: 296 MS
QTC INTERVAL: 452 MS

## 2024-12-23 NOTE — CASE MANAGEMENT/SOCIAL WORK
Case Management Discharge Note      Final Note: Return home with new DME.         Selected Continued Care - Discharged on 12/22/2024 Admission date: 12/21/2024 - Discharge disposition: Home or Self Care      Durable Medical Equipment Coordination complete.      Service Provider Services Address Phone Fax Patient Preferred    [facility] [address], [place] [address] [phone] --               Transportation Services  Private: Car    Final Discharge Disposition Code: 01 - home or self-care

## 2024-12-26 LAB
BACTERIA SPEC AEROBE CULT: NORMAL
BACTERIA SPEC AEROBE CULT: NORMAL

## 2025-07-17 ENCOUNTER — ON CAMPUS - OUTPATIENT (AMBULATORY)
Dept: URBAN - METROPOLITAN AREA HOSPITAL 2 | Facility: HOSPITAL | Age: 45
End: 2025-07-17
Payer: COMMERCIAL

## 2025-07-17 VITALS
DIASTOLIC BLOOD PRESSURE: 81 MMHG | SYSTOLIC BLOOD PRESSURE: 108 MMHG | RESPIRATION RATE: 18 BRPM | HEART RATE: 78 BPM | TEMPERATURE: 97.9 F | SYSTOLIC BLOOD PRESSURE: 115 MMHG | SYSTOLIC BLOOD PRESSURE: 146 MMHG | OXYGEN SATURATION: 100 % | SYSTOLIC BLOOD PRESSURE: 160 MMHG | WEIGHT: 295 LBS | DIASTOLIC BLOOD PRESSURE: 88 MMHG | RESPIRATION RATE: 17 BRPM | HEART RATE: 89 BPM | HEIGHT: 71 IN | DIASTOLIC BLOOD PRESSURE: 89 MMHG | HEART RATE: 74 BPM | HEART RATE: 64 BPM | SYSTOLIC BLOOD PRESSURE: 125 MMHG | RESPIRATION RATE: 16 BRPM | DIASTOLIC BLOOD PRESSURE: 68 MMHG | SYSTOLIC BLOOD PRESSURE: 166 MMHG | DIASTOLIC BLOOD PRESSURE: 100 MMHG | OXYGEN SATURATION: 99 % | DIASTOLIC BLOOD PRESSURE: 70 MMHG | HEART RATE: 83 BPM | SYSTOLIC BLOOD PRESSURE: 133 MMHG | OXYGEN SATURATION: 98 %

## 2025-07-17 DIAGNOSIS — D12.2 BENIGN NEOPLASM OF ASCENDING COLON: ICD-10-CM

## 2025-07-17 DIAGNOSIS — Z12.11 ENCOUNTER FOR SCREENING FOR MALIGNANT NEOPLASM OF COLON: ICD-10-CM

## 2025-07-17 DIAGNOSIS — K63.5 POLYP OF COLON: ICD-10-CM

## 2025-07-17 DIAGNOSIS — D12.3 BENIGN NEOPLASM OF TRANSVERSE COLON: ICD-10-CM

## 2025-07-17 DIAGNOSIS — D12.4 BENIGN NEOPLASM OF DESCENDING COLON: ICD-10-CM

## 2025-07-17 LAB
GI HISTOLOGY: A. ASCENDING COLON: (no result)
GI HISTOLOGY: B. TRANSVERSE COLON: (no result)
GI HISTOLOGY: C. DESCENDING COLON: (no result)
GI HISTOLOGY: D. SIGMOID COLON: (no result)
GI HISTOLOGY: PDF REPORT: (no result)

## 2025-07-17 PROCEDURE — 45385 COLONOSCOPY W/LESION REMOVAL: CPT | Mod: 33 | Performed by: INTERNAL MEDICINE

## 2025-08-06 ENCOUNTER — TRANSCRIBE ORDERS (OUTPATIENT)
Dept: ADMINISTRATIVE | Facility: HOSPITAL | Age: 45
End: 2025-08-06
Payer: MEDICAID

## 2025-08-06 DIAGNOSIS — J45.30 MILD PERSISTENT ASTHMA, WELL CONTROLLED: Primary | ICD-10-CM

## 2025-08-26 ENCOUNTER — HOSPITAL ENCOUNTER (OUTPATIENT)
Dept: RESPIRATORY THERAPY | Facility: HOSPITAL | Age: 45
Discharge: HOME | End: 2025-08-26
Payer: OTHER GOVERNMENT

## 2025-08-26 VITALS — OXYGEN SATURATION: 94 % | RESPIRATION RATE: 16 BRPM | HEART RATE: 74 BPM

## 2025-08-26 DIAGNOSIS — J45.30 MILD PERSISTENT ASTHMA, WELL CONTROLLED: ICD-10-CM

## 2025-08-26 PROCEDURE — 94664 DEMO&/EVAL PT USE INHALER: CPT

## 2025-08-26 PROCEDURE — 94799 UNLISTED PULMONARY SVC/PX: CPT

## 2025-08-26 PROCEDURE — 94729 DIFFUSING CAPACITY: CPT

## 2025-08-26 PROCEDURE — 94726 PLETHYSMOGRAPHY LUNG VOLUMES: CPT

## 2025-08-26 PROCEDURE — 94060 EVALUATION OF WHEEZING: CPT

## 2025-08-26 RX ORDER — ALBUTEROL SULFATE 90 UG/1
2 INHALANT RESPIRATORY (INHALATION) ONCE
Status: COMPLETED | OUTPATIENT
Start: 2025-08-26 | End: 2025-08-26

## 2025-08-26 RX ADMIN — ALBUTEROL SULFATE 2 PUFF: 90 AEROSOL, METERED RESPIRATORY (INHALATION) at 17:05

## (undated) DEVICE — PK ENDO GI 50

## (undated) DEVICE — BITEBLOCK ENDO W/STRAP 60F A/ LF DISP